# Patient Record
Sex: FEMALE | Race: BLACK OR AFRICAN AMERICAN | Employment: UNEMPLOYED | URBAN - METROPOLITAN AREA
[De-identification: names, ages, dates, MRNs, and addresses within clinical notes are randomized per-mention and may not be internally consistent; named-entity substitution may affect disease eponyms.]

---

## 2017-02-15 ENCOUNTER — TELEPHONE (OUTPATIENT)
Dept: INTERNAL MEDICINE | Age: 47
End: 2017-02-15

## 2017-02-15 DIAGNOSIS — K92.2 UPPER GI BLEED: Primary | ICD-10-CM

## 2017-02-15 DIAGNOSIS — K59.1 FUNCTIONAL DIARRHEA: ICD-10-CM

## 2017-02-15 DIAGNOSIS — K92.0 HEMATEMESIS WITH NAUSEA: ICD-10-CM

## 2017-02-17 PROBLEM — K92.0 GASTROINTESTINAL HEMORRHAGE WITH HEMATEMESIS: Status: ACTIVE | Noted: 2017-02-15

## 2017-02-23 ENCOUNTER — CARE COORDINATION (OUTPATIENT)
Dept: CARE COORDINATION | Age: 47
End: 2017-02-23

## 2017-03-23 RX ORDER — PROMETHAZINE HYDROCHLORIDE 25 MG/1
TABLET ORAL
Qty: 30 TABLET | Refills: 0 | Status: SHIPPED | OUTPATIENT
Start: 2017-03-23 | End: 2017-04-20 | Stop reason: SDUPTHER

## 2017-04-21 ENCOUNTER — OFFICE VISIT (OUTPATIENT)
Dept: INTERNAL MEDICINE | Age: 47
End: 2017-04-21

## 2017-04-21 VITALS
DIASTOLIC BLOOD PRESSURE: 90 MMHG | BODY MASS INDEX: 33.74 KG/M2 | SYSTOLIC BLOOD PRESSURE: 132 MMHG | OXYGEN SATURATION: 97 % | WEIGHT: 215.4 LBS | HEART RATE: 84 BPM

## 2017-04-21 DIAGNOSIS — I10 ESSENTIAL HYPERTENSION: Chronic | ICD-10-CM

## 2017-04-21 DIAGNOSIS — E11.9 TYPE 2 DIABETES MELLITUS WITHOUT COMPLICATION, WITH LONG-TERM CURRENT USE OF INSULIN (HCC): Primary | Chronic | ICD-10-CM

## 2017-04-21 DIAGNOSIS — E78.5 HYPERLIPIDEMIA, UNSPECIFIED HYPERLIPIDEMIA TYPE: Chronic | ICD-10-CM

## 2017-04-21 DIAGNOSIS — Z79.4 TYPE 2 DIABETES MELLITUS WITHOUT COMPLICATION, WITH LONG-TERM CURRENT USE OF INSULIN (HCC): Primary | Chronic | ICD-10-CM

## 2017-04-21 DIAGNOSIS — G47.00 INSOMNIA, UNSPECIFIED TYPE: Chronic | ICD-10-CM

## 2017-04-21 PROCEDURE — 99214 OFFICE O/P EST MOD 30 MIN: CPT | Performed by: INTERNAL MEDICINE

## 2017-04-21 PROCEDURE — 1036F TOBACCO NON-USER: CPT | Performed by: INTERNAL MEDICINE

## 2017-04-21 PROCEDURE — G8417 CALC BMI ABV UP PARAM F/U: HCPCS | Performed by: INTERNAL MEDICINE

## 2017-04-21 PROCEDURE — 3044F HG A1C LEVEL LT 7.0%: CPT | Performed by: INTERNAL MEDICINE

## 2017-04-21 PROCEDURE — G8598 ASA/ANTIPLAT THER USED: HCPCS | Performed by: INTERNAL MEDICINE

## 2017-04-21 PROCEDURE — G8427 DOCREV CUR MEDS BY ELIG CLIN: HCPCS | Performed by: INTERNAL MEDICINE

## 2017-04-21 RX ORDER — CLONAZEPAM 1 MG/1
1 TABLET ORAL 2 TIMES DAILY PRN
COMMUNITY
Start: 2017-04-21 | End: 2017-09-27 | Stop reason: SDUPTHER

## 2017-04-21 RX ORDER — PRAVASTATIN SODIUM 80 MG/1
80 TABLET ORAL EVERY EVENING
COMMUNITY
Start: 2017-04-21 | End: 2018-04-21

## 2017-04-21 RX ORDER — TRAZODONE HYDROCHLORIDE 100 MG/1
200 TABLET ORAL NIGHTLY
COMMUNITY
Start: 2017-04-21

## 2017-04-21 RX ORDER — DULOXETIN HYDROCHLORIDE 30 MG/1
90 CAPSULE, DELAYED RELEASE ORAL DAILY
COMMUNITY
Start: 2017-04-21 | End: 2017-09-27 | Stop reason: ALTCHOICE

## 2017-04-21 ASSESSMENT — PATIENT HEALTH QUESTIONNAIRE - PHQ9
SUM OF ALL RESPONSES TO PHQ9 QUESTIONS 1 & 2: 0
1. LITTLE INTEREST OR PLEASURE IN DOING THINGS: 0
SUM OF ALL RESPONSES TO PHQ QUESTIONS 1-9: 0
SUM OF ALL RESPONSES TO PHQ QUESTIONS 1-9: 0
1. LITTLE INTEREST OR PLEASURE IN DOING THINGS: 0
SUM OF ALL RESPONSES TO PHQ9 QUESTIONS 1 & 2: 0
2. FEELING DOWN, DEPRESSED OR HOPELESS: 0
2. FEELING DOWN, DEPRESSED OR HOPELESS: 0

## 2017-04-29 ASSESSMENT — ENCOUNTER SYMPTOMS
EYES NEGATIVE: 1
ORTHOPNEA: 0
GASTROINTESTINAL NEGATIVE: 1
RESPIRATORY NEGATIVE: 1
SHORTNESS OF BREATH: 0
BLURRED VISION: 0

## 2017-05-08 ENCOUNTER — OFFICE VISIT (OUTPATIENT)
Dept: INTERNAL MEDICINE CLINIC | Age: 47
End: 2017-05-08

## 2017-05-08 VITALS
TEMPERATURE: 98.2 F | SYSTOLIC BLOOD PRESSURE: 130 MMHG | HEART RATE: 101 BPM | DIASTOLIC BLOOD PRESSURE: 72 MMHG | OXYGEN SATURATION: 99 %

## 2017-05-08 DIAGNOSIS — R56.9 SEIZURE (HCC): ICD-10-CM

## 2017-05-08 DIAGNOSIS — Z01.818 PRE-OP EXAMINATION: Primary | ICD-10-CM

## 2017-05-08 DIAGNOSIS — Z86.718 HISTORY OF DVT (DEEP VEIN THROMBOSIS): ICD-10-CM

## 2017-05-08 DIAGNOSIS — M25.562 ACUTE PAIN OF LEFT KNEE: ICD-10-CM

## 2017-05-08 DIAGNOSIS — E66.9 DIABETES MELLITUS TYPE 2 IN OBESE (HCC): ICD-10-CM

## 2017-05-08 DIAGNOSIS — E11.69 DIABETES MELLITUS TYPE 2 IN OBESE (HCC): ICD-10-CM

## 2017-05-08 PROCEDURE — 1036F TOBACCO NON-USER: CPT | Performed by: NURSE PRACTITIONER

## 2017-05-08 PROCEDURE — 99214 OFFICE O/P EST MOD 30 MIN: CPT | Performed by: NURSE PRACTITIONER

## 2017-05-08 PROCEDURE — G8428 CUR MEDS NOT DOCUMENT: HCPCS | Performed by: NURSE PRACTITIONER

## 2017-05-08 PROCEDURE — G8417 CALC BMI ABV UP PARAM F/U: HCPCS | Performed by: NURSE PRACTITIONER

## 2017-05-08 PROCEDURE — G8598 ASA/ANTIPLAT THER USED: HCPCS | Performed by: NURSE PRACTITIONER

## 2017-05-08 RX ORDER — LISINOPRIL 10 MG/1
5 TABLET ORAL DAILY
COMMUNITY
Start: 2017-04-28 | End: 2017-09-27 | Stop reason: ALTCHOICE

## 2017-05-08 ASSESSMENT — ENCOUNTER SYMPTOMS
HEMOPTYSIS: 0
BLOOD IN STOOL: 0
ABDOMINAL PAIN: 0
COUGH: 0
EYE REDNESS: 0
SPUTUM PRODUCTION: 0
PHOTOPHOBIA: 0
SHORTNESS OF BREATH: 0
HEARTBURN: 0
SORE THROAT: 0
NAUSEA: 0
EYE PAIN: 0
VOMITING: 0
DIARRHEA: 0
BACK PAIN: 0
DOUBLE VISION: 0
BLURRED VISION: 0
WHEEZING: 0
ORTHOPNEA: 0
EYE DISCHARGE: 0
CONSTIPATION: 0
STRIDOR: 0

## 2017-08-14 RX ORDER — PROMETHAZINE HYDROCHLORIDE 25 MG/1
TABLET ORAL
Qty: 30 TABLET | Refills: 0 | Status: SHIPPED | OUTPATIENT
Start: 2017-08-14 | End: 2017-09-21 | Stop reason: SDUPTHER

## 2017-09-27 ENCOUNTER — OFFICE VISIT (OUTPATIENT)
Dept: INTERNAL MEDICINE | Age: 47
End: 2017-09-27

## 2017-09-27 VITALS
OXYGEN SATURATION: 98 % | BODY MASS INDEX: 30.45 KG/M2 | HEART RATE: 71 BPM | DIASTOLIC BLOOD PRESSURE: 94 MMHG | HEIGHT: 67 IN | WEIGHT: 194 LBS | SYSTOLIC BLOOD PRESSURE: 152 MMHG

## 2017-09-27 DIAGNOSIS — K92.0 GASTROINTESTINAL HEMORRHAGE WITH HEMATEMESIS: ICD-10-CM

## 2017-09-27 DIAGNOSIS — E78.5 HYPERLIPIDEMIA, UNSPECIFIED HYPERLIPIDEMIA TYPE: Chronic | ICD-10-CM

## 2017-09-27 DIAGNOSIS — D64.9 ANEMIA, UNSPECIFIED TYPE: ICD-10-CM

## 2017-09-27 DIAGNOSIS — I10 ESSENTIAL HYPERTENSION: Chronic | ICD-10-CM

## 2017-09-27 DIAGNOSIS — K92.1 MELENA: ICD-10-CM

## 2017-09-27 DIAGNOSIS — E11.9 TYPE 2 DIABETES MELLITUS WITHOUT COMPLICATION, WITH LONG-TERM CURRENT USE OF INSULIN (HCC): Primary | Chronic | ICD-10-CM

## 2017-09-27 DIAGNOSIS — K92.1 HEMATOCHEZIA: ICD-10-CM

## 2017-09-27 DIAGNOSIS — R56.9 SEIZURES (HCC): ICD-10-CM

## 2017-09-27 DIAGNOSIS — F31.81 BIPOLAR II DISORDER (HCC): ICD-10-CM

## 2017-09-27 DIAGNOSIS — Z79.4 TYPE 2 DIABETES MELLITUS WITHOUT COMPLICATION, WITH LONG-TERM CURRENT USE OF INSULIN (HCC): Chronic | ICD-10-CM

## 2017-09-27 DIAGNOSIS — F44.9 CONVERSION DISORDER: Chronic | ICD-10-CM

## 2017-09-27 DIAGNOSIS — K92.0 HEMATEMESIS WITH NAUSEA: ICD-10-CM

## 2017-09-27 DIAGNOSIS — F41.9 ANXIETY: Chronic | ICD-10-CM

## 2017-09-27 DIAGNOSIS — E11.9 TYPE 2 DIABETES MELLITUS WITHOUT COMPLICATION, WITH LONG-TERM CURRENT USE OF INSULIN (HCC): Chronic | ICD-10-CM

## 2017-09-27 DIAGNOSIS — R11.0 NAUSEA: ICD-10-CM

## 2017-09-27 DIAGNOSIS — Z79.4 TYPE 2 DIABETES MELLITUS WITHOUT COMPLICATION, WITH LONG-TERM CURRENT USE OF INSULIN (HCC): Primary | Chronic | ICD-10-CM

## 2017-09-27 LAB
ALBUMIN SERPL-MCNC: 4.4 G/DL (ref 3.4–5)
ALP BLD-CCNC: 67 U/L (ref 40–129)
ALT SERPL-CCNC: 22 U/L (ref 10–40)
ANION GAP SERPL CALCULATED.3IONS-SCNC: 13 MMOL/L (ref 3–16)
AST SERPL-CCNC: 33 U/L (ref 15–37)
BASOPHILS ABSOLUTE: 0 K/UL (ref 0–0.2)
BASOPHILS RELATIVE PERCENT: 0.5 %
BILIRUB SERPL-MCNC: <0.2 MG/DL (ref 0–1)
BILIRUBIN DIRECT: <0.2 MG/DL (ref 0–0.3)
BILIRUBIN URINE: ABNORMAL
BILIRUBIN, INDIRECT: NORMAL MG/DL (ref 0–1)
BLOOD, URINE: NEGATIVE
BUN BLDV-MCNC: 14 MG/DL (ref 7–20)
CALCIUM SERPL-MCNC: 9.3 MG/DL (ref 8.3–10.6)
CHLORIDE BLD-SCNC: 103 MMOL/L (ref 99–110)
CHOLESTEROL, TOTAL: 172 MG/DL (ref 0–199)
CLARITY: ABNORMAL
CO2: 29 MMOL/L (ref 21–32)
COLOR: ABNORMAL
CREAT SERPL-MCNC: 1.2 MG/DL (ref 0.6–1.1)
CREATININE URINE: 418.5 MG/DL (ref 28–259)
EOSINOPHILS ABSOLUTE: 0.1 K/UL (ref 0–0.6)
EOSINOPHILS RELATIVE PERCENT: 2 %
EPITHELIAL CELLS, UA: 3 /HPF (ref 0–5)
FERRITIN: 47.6 NG/ML (ref 15–150)
FOLATE: 12.83 NG/ML (ref 4.78–24.2)
GFR AFRICAN AMERICAN: 58
GFR NON-AFRICAN AMERICAN: 48
GLUCOSE BLD-MCNC: 77 MG/DL (ref 70–99)
GLUCOSE URINE: NEGATIVE MG/DL
HAPTOGLOBIN: 102 MG/DL (ref 30–200)
HCT VFR BLD CALC: 35.1 % (ref 36–48)
HDLC SERPL-MCNC: 83 MG/DL (ref 40–60)
HEMOGLOBIN: 11.5 G/DL (ref 12–16)
HYALINE CASTS: 3 /LPF (ref 0–8)
IRON SATURATION: 30 % (ref 15–50)
IRON: 85 UG/DL (ref 37–145)
KETONES, URINE: ABNORMAL MG/DL
LACTATE DEHYDROGENASE: 240 U/L (ref 100–190)
LDL CHOLESTEROL CALCULATED: 74 MG/DL
LEUKOCYTE ESTERASE, URINE: NEGATIVE
LYMPHOCYTES ABSOLUTE: 2.2 K/UL (ref 1–5.1)
LYMPHOCYTES RELATIVE PERCENT: 53.1 %
MCH RBC QN AUTO: 28.3 PG (ref 26–34)
MCHC RBC AUTO-ENTMCNC: 32.7 G/DL (ref 31–36)
MCV RBC AUTO: 86.4 FL (ref 80–100)
MICROALBUMIN UR-MCNC: <1.2 MG/DL
MICROALBUMIN/CREAT UR-RTO: ABNORMAL MG/G (ref 0–30)
MICROSCOPIC EXAMINATION: YES
MONOCYTES ABSOLUTE: 0.2 K/UL (ref 0–1.3)
MONOCYTES RELATIVE PERCENT: 4.5 %
NEUTROPHILS ABSOLUTE: 1.7 K/UL (ref 1.7–7.7)
NEUTROPHILS RELATIVE PERCENT: 39.9 %
NITRITE, URINE: NEGATIVE
PDW BLD-RTO: 13.2 % (ref 12.4–15.4)
PH UA: 6
PHOSPHORUS: 4 MG/DL (ref 2.5–4.9)
PLATELET # BLD: 206 K/UL (ref 135–450)
PMV BLD AUTO: 8.9 FL (ref 5–10.5)
POTASSIUM SERPL-SCNC: 3.8 MMOL/L (ref 3.5–5.1)
PROTEIN UA: 30 MG/DL
RBC # BLD: 4.06 M/UL (ref 4–5.2)
RBC UA: 3 /HPF (ref 0–4)
SODIUM BLD-SCNC: 145 MMOL/L (ref 136–145)
SPECIFIC GRAVITY UA: 1.03
TOTAL IRON BINDING CAPACITY: 282 UG/DL (ref 260–445)
TOTAL PROTEIN: 7.4 G/DL (ref 6.4–8.2)
TRIGL SERPL-MCNC: 77 MG/DL (ref 0–150)
TSH SERPL DL<=0.05 MIU/L-ACNC: 1.98 UIU/ML (ref 0.27–4.2)
URINE TYPE: ABNORMAL
UROBILINOGEN, URINE: 0.2 E.U./DL
VITAMIN B-12: 594 PG/ML (ref 211–911)
VLDLC SERPL CALC-MCNC: 15 MG/DL
WBC # BLD: 4.2 K/UL (ref 4–11)
WBC UA: 3 /HPF (ref 0–5)

## 2017-09-27 PROCEDURE — 1036F TOBACCO NON-USER: CPT | Performed by: INTERNAL MEDICINE

## 2017-09-27 PROCEDURE — 3044F HG A1C LEVEL LT 7.0%: CPT | Performed by: INTERNAL MEDICINE

## 2017-09-27 PROCEDURE — 99213 OFFICE O/P EST LOW 20 MIN: CPT | Performed by: INTERNAL MEDICINE

## 2017-09-27 PROCEDURE — G8598 ASA/ANTIPLAT THER USED: HCPCS | Performed by: INTERNAL MEDICINE

## 2017-09-27 PROCEDURE — G8427 DOCREV CUR MEDS BY ELIG CLIN: HCPCS | Performed by: INTERNAL MEDICINE

## 2017-09-27 PROCEDURE — G8417 CALC BMI ABV UP PARAM F/U: HCPCS | Performed by: INTERNAL MEDICINE

## 2017-09-27 RX ORDER — CLONAZEPAM 1 MG/1
1 TABLET ORAL DAILY PRN
COMMUNITY
Start: 2017-09-27

## 2017-09-27 RX ORDER — QUETIAPINE FUMARATE 100 MG/1
100 TABLET, FILM COATED ORAL NIGHTLY
COMMUNITY
Start: 2017-09-27 | End: 2018-06-29 | Stop reason: ALTCHOICE

## 2017-09-27 RX ORDER — DULOXETIN HYDROCHLORIDE 60 MG/1
60 CAPSULE, DELAYED RELEASE ORAL 2 TIMES DAILY
COMMUNITY
Start: 2017-09-27 | End: 2018-06-29 | Stop reason: ALTCHOICE

## 2017-09-27 RX ORDER — LIDOCAINE AND PRILOCAINE 25; 25 MG/G; MG/G
CREAM TOPICAL
COMMUNITY
Start: 2017-09-27

## 2017-09-27 ASSESSMENT — ENCOUNTER SYMPTOMS
COUGH: 1
ORTHOPNEA: 0
SHORTNESS OF BREATH: 0
HEMATOCHEZIA: 1
BLURRED VISION: 0

## 2017-09-27 NOTE — MR AVS SNAPSHOT
After Visit Summary             Cristian Nieves   2017 10:45 AM   Office Visit    Description:  Female : 1970   Provider:  Surjit Lorenzana MD   Department:  [facility] [address]              Your Follow-Up and Future Appointments         Below is a list of your follow-up and future appointments. This may not be a complete list as you may have made appointments directly with providers that we are not aware of or your providers may have made some for you. Please call your providers to confirm appointments. It is important to keep your appointments. Please bring your current insurance card, photo ID, co-pay, and all medication bottles to your appointment. If self-pay, payment is expected at the time of service. Your To-Do List     Future Appointments Provider Department Dept Phone    10/23/2017 10:30 AM Surjit Lorenzana MD [facility] [address] [phone]    Please arrive 15 minutes prior to appointment, bring photo ID and insurance card.     Future Orders Complete By Expires    CBC Auto Differential [DUI8852 Custom]  2017    Ferritin [LAB68 Custom]  2017    Folate [LAB69 Custom]  2017    Haptoglobin [LAB89 Custom]  2017    Hemoglobin A1C [LAB90 Custom]  2017    Hepatic Function Panel [LAB20 Custom]  2017    Iron and TIBC [MGG695 Custom]  2017    Lactate Dehydrogenase [LAB96 Custom]  2017    Lipid Panel [LAB18 Custom]  2017    Methylmalonic Acid, Serum [DDT167 Custom]  2017    Microalbumin / Creatinine Urine Ratio [KVF387 Custom]  2017    Renal Function Panel [LAB19 Custom]  2017    Soluble transferrin receptor [AXE0158 Custom]  2017    TSH without Reflex [VLC720 Custom]  2017    Urinalysis [CFM637 Custom]  2017    Vitamin B12 [LAB67 Custom]  2017 Information from Your Visit        Department     Name Address Phone Fax    [address] [place]  [address], [address] [phone]      You Were Seen for:         Comments    Type 2 diabetes mellitus without complication, with long-term current use of insulin ([facility])   [[id number]]         Vital Signs     Blood Pressure Pulse Height Weight Oxygen Saturation Body Mass Index    152/94 (Site: Right Arm, Position: Sitting, Cuff Size: Medium Adult) 71 5' 7\" (1.702 m) 194 lb (88 kg) 98% 30.38 kg/m2    Smoking Status                   Never Smoker           Additional Information about your Body Mass Index (BMI)           Your BMI as listed above is considered obese (30 or more). BMI is an estimate of body fat, calculated from your height and weight. The higher your BMI, the greater your risk of heart disease, high blood pressure, type 2 diabetes, stroke, gallstones, arthritis, sleep apnea, and certain cancers. BMI is not perfect. It may overestimate body fat in athletes and people who are more muscular. Even a small weight loss (between 5 and 10 percent of your current weight) by decreasing your calorie intake and becoming more physically active will help lower your risk of developing or worsening diseases associated with obesity. Learn more at: [employer]             Today's Medication Changes          These changes are accurate as of: 9/27/17 11:58 AM.  If you have any questions, ask your nurse or doctor. START taking these medications           DULoxetine 60 MG extended release capsule   Commonly known as:  CYMBALTA   Instructions:   Take 1 capsule by mouth 2 times daily   Refills:  0   Started by:  Stacy Jarrett MD       lidocaine-prilocaine 2.5-2.5 % cream   Commonly known as:  EMLA   Instructions:  apply to the lower back twice a day as needed while using a TENS unit   Refills:  0 hydrochlorothiazide (HYDRODIURIL) 25 MG tablet Take 1 tablet by mouth daily    isosorbide mononitrate (IMDUR) 30 MG CR tablet Take 1 tablet by mouth daily    candesartan (ATACAND) 32 MG tablet Take 1 tablet by mouth daily    Dulaglutide 1.5 MG/0.5ML SOPN Inject 1.5 mg into the skin every 7 days (On Saturdays)    gabapentin (NEURONTIN) 800 MG tablet Take 1 tablet by mouth 3 times daily    HYDROcodone-acetaminophen (NORCO) 5-325 MG per tablet Take 1 tablet by mouth 3 times daily as needed for Pain    docusate sodium (COLACE) 100 MG capsule Take 1 capsule by mouth 2 times daily    Melatonin 3 MG CAPS Take 1 capsule by mouth nightly    vitamin D (CHOLECALCIFEROL) 1000 UNIT TABS tablet Take 1 tablet by mouth daily    promethazine (PHENERGAN) 25 MG tablet TAKE ONE TABLET BY MOUTH TWICE A DAY AS NEEDED FOR NAUSEA      Allergies              Naprosyn [Naproxen] Hives, Shortness Of Breath    Compazine [Prochlorperazine Maleate] Hives    Ketorolac Tromethamine Other (See Comments)    Breathing problems    Tramadol Rash      We Ordered/Performed the following           Ambulatory referral to Gastroenterology     Scheduling Instructions:     [address] [facility] --- [phone]  Fax [phone] [facility] - medical assistant for Dr. Karey Forde)      Dr. Anusha Duncan - office locations [place]  Dr. Mila Lopez - office locations [place]  Dr. Finn Calderon - office locations [place] - office locations [place]    Dr. Scooby Ham - office location [place]  Dr. Charley Rossi - office location [place]  Dr. Imelda Winchester - office locations [place]/Kevin Fleming - office locations [place]/Kevin Quiñonez - office locations [place]    Dr. Isidro Porter [facility] Dr. Evert Nieves [facility]    Dr. Jayde Mitchell Folds    Comments: The patient can be scheduled with any member of the group, including the provider with the first available appointments. Additional Information        Basic Information     Date Of Birth Sex Race Ethnicity Preferred Language    1970 Female Black Non-/Non  English      Problem List as of 9/27/2017  Date Reviewed: 10/19/2016                Type 2 diabetes mellitus without complication, with long-term current use of insulin (HCC) (Chronic)    Essential hypertension (Chronic)    Hyperlipidemia (Chronic)    Bone spur (Chronic)    Seizure disorder (HCC) (Chronic)    Insomnia (Chronic)    Abdominal adhesions (Chronic)    Conversion disorder (Chronic)    Fatty liver (Chronic)    Anxiety (Chronic)    Lumbar radiculopathy (Chronic)    Gastroesophageal reflux disease without esophagitis (Chronic)    Hematochezia    Anemia    Melena    Bipolar 2 disorder (HCC)    PTSD (post-traumatic stress disorder)    Suicidal ideation    Epigastric abdominal pain    Seizure (HCC)    Hematemesis with nausea    Gastrointestinal hemorrhage with hematemesis    Functional diarrhea    Obesity (BMI 30-39. 9)    Preoperative examination    Unstable angina (HCC)    Generalized abdominal pain    Nausea    Intractable vomiting with nausea    Left wrist pain    Chest pain    Tachycardia    Leg swelling    Seizure grand mal (HCC)    Hypoglycemia    History of DVT (deep vein thrombosis)      Your Goals as of 9/27/2017 at 11:58 AM              Today    Today    9/7/17       Blood Pressure    Blood Pressure < 140/90   152/94  162/100  132/89    Blood Pressure < 140/90   152/94  162/100  132/89    Notes    Barriers to success: none  Plan for overcoming my barriers: N/A     Confidence: 1/10 Date goal set: 4/21/17  Date goal attained:           Immunizations as of 9/27/2017     Name Date    Influenza Vaccine, unspecified formulation 10/1/2016    Influenza Virus Vaccine 10/16/2012, 1/8/2011    Pneumococcal Conjugate 7-valent 10/8/2009      Preventive Care        Date Due    Tetanus Combination Vaccine (1 - Tdap) 3/2/1989    Diabetic Foot Exam 9/1/2016    Eye Exam By An Eye Doctor 9/1/2016    Urine Check For Kidney Problems 2/10/2017    Cholesterol Screening 5/6/2017    Yearly Flu Vaccine (1) 9/1/2017    Hemoglobin A1C (Test For Long-Term Glucose Control) 10/28/2017    Pap Smear 2/10/2019            MyChart Signup           Our records indicate that you have an active [employer] account. You can view your After Visit Summary by going to [email or web address] and logging in with your Indexing username and password. If you don't have a Indexing username and password but a parent or guardian has access to your record, the parent or guardian should login with their own [employer] username and password and access your record to view the After Visit Summary. Additional Information  If you have questions, please contact the physician practice where you receive care. Remember, Indexing is NOT to be used for urgent needs. For medical emergencies, dial 911. For questions regarding your [employer] account call [phone]. If you have a clinical question, please call your doctor's office.

## 2017-09-27 NOTE — PROGRESS NOTES
HYDROcodone-acetaminophen (NORCO) 5-325 MG per tablet Take 1 tablet by mouth 3 times daily as needed for Pain      docusate sodium (COLACE) 100 MG capsule Take 1 capsule by mouth 2 times daily      Melatonin 3 MG CAPS Take 1 capsule by mouth nightly      vitamin D (CHOLECALCIFEROL) 1000 UNIT TABS tablet Take 1 tablet by mouth daily                     Allergies   Allergen Reactions    Naprosyn [Naproxen] Hives and Shortness Of Breath    Compazine [Prochlorperazine Maleate] Hives    Ketorolac Tromethamine Other (See Comments)     Breathing problems    Tramadol Rash                  Immunization History   Administered Date(s) Administered    Influenza Vaccine, unspecified formulation 10/01/2016    Influenza Virus Vaccine 01/08/2011, 10/16/2012    Pneumococcal Conjugate 7-valent 10/08/2009                  Patient Active Problem List   Diagnosis Code    Bone spur M77.9    Hypoglycemia E16.2    History of DVT (deep vein thrombosis) Z86.718    Seizure grand mal ([address] G40.409    Chest pain R07.9    Tachycardia R00.0    Leg swelling M79.89    Essential hypertension I10    Hyperlipidemia E78.5    Gastroesophageal reflux disease without esophagitis K21.9    Seizure disorder ([facility]) G40.909    Lumbar radiculopathy M54.16    Left wrist pain M25.532    Anxiety F41.9    Generalized abdominal pain R10.84    Nausea R11.0    Intractable vomiting with nausea R11.2    Fatty liver K76.0    Unstable angina ([facility]) I20.0    Abdominal pain, left lower quadrant R10.32    Hypertensive urgency I16.0    Pseudoseizures F44.5    Diabetes mellitus type 2 in obese (HCC) E11.9, E66.9    Pleurodynia R07.81    Abdominal adhesions K66.0    Conversion disorder F44.9    Insomnia G47.00    Type 2 diabetes mellitus without complication, with long-term current use of insulin ([facility]) E11.9, Z79.4    Preoperative examination Z01.818    Obesity (BMI 30-39. 9) E66.9    Hematemesis with nausea K92.0, R11.0    HISTORY N/A 10/27/2016    DIAGNOSTIC LAPAROSCOPY WITH LYSIS OF ADHESIONS    SALPINGO-OOPHORECTOMY  2001    [facility] - ZACHERY - left   Melva Grave SHOULDER SURGERY      x 2    TOE SURGERY  March 10, 2011    excision of bone spur left hallux    TOE SURGERY  March 10, 2011    Sven Man arthrodesis left SUB JOINT    UPPER GASTROINTESTINAL ENDOSCOPY                      Social History     Social History    Marital status:      Spouse name: Javid Marquez Number of children: 2    Years of education: N/A     Occupational History    disability      bipolar     Social History Main Topics    Smoking status: Never Smoker    Smokeless tobacco: Never Used    Alcohol use No    Drug use: No    Sexual activity: Yes     Partners: Male      Comment:  - [facility] [place] [id number], [address]     Other Topics Concern    None     Social History Narrative                    Family History   Problem Relation Age of Onset    Diabetes Mother     Heart Disease Mother     Stroke Mother     High Blood Pressure Mother     Emphysema Father     Liver Disease Father     High Blood Pressure Father     Heart Disease Father     Stroke Father     Colon Cancer Father     Cancer Maternal Aunt      uterine, breast    High Blood Pressure Sister                     Controlled Substances Monitoring:                   Review of Systems:           Review of Systems   Eyes: Negative for blurred vision. Respiratory: Positive for cough. Negative for shortness of breath. Cardiovascular: Positive for chest pain. Negative for palpitations, orthopnea and PND. Gastrointestinal: Positive for hematochezia. Musculoskeletal: Negative for neck pain. Neurological: Negative for focal weakness. Physical Examination:             Physical Exam   Constitutional: She appears well-developed and well-nourished. No distress. HENT:   Head: Normocephalic and atraumatic. Eyes: Conjunctivae and EOM are normal. Right eye exhibits no discharge.  Left eye exhibits no discharge. No scleral icterus. Neck: No tracheal deviation present. Pulmonary/Chest: Effort normal. No respiratory distress. Neurological: She is alert. No cranial nerve deficit. Skin: Skin is warm and dry. She is not diaphoretic. Vitals reviewed. Assessment and Plan:            Encounter Diagnoses   Name Primary?  Type 2 diabetes mellitus without complication, with long-term current use of insulin (HCC) Yes    Conversion disorder     Essential hypertension     Hyperlipidemia, unspecified hyperlipidemia type     Anxiety     Gastrointestinal hemorrhage with hematemesis     Hematemesis with nausea     Nausea     Hematochezia     Melena     Anemia, unspecified type               No problem-specific Assessment & Plan notes found for this encounter. The conditions, diseases and symptoms above (see encounter diagnosis list) were reviewed and assessed. The patient's chronic medical problems that were addressed during this appointment are stable or controlled unless noted otherwise. The chart was updated as necessary. Labs reviewed as necessary. New labs ordered as clinically indicated. Imaging studies reviewed as necessary. Medications reviewed. New medications ordered as clinically indicated. New orders written as clinically indicated (see below). Referrals made to specialists as necessary. Patient education provided verbally and in writing as necessary. Orders Placed This Encounter   Procedures    CBC Auto Differential     Standing Status:   Future     Standing Expiration Date:   9/27/2018    Iron and TIBC     Standing Status:   Future     Standing Expiration Date:   9/27/2018     Order Specific Question:   Is Patient Fasting? Answer:   yes      Order Specific Question:   No of Hours?      Answer:   10    Ferritin     Standing Status:   Future     Standing Expiration Date:   9/27/2018    Vitamin B12     Standing type  -     Lipid Panel; Future  -     Hepatic Function Panel; Future  -     TSH without Reflex; Future    Anxiety    Gastrointestinal hemorrhage with hematemesis  -     CBC Auto Differential; Future  -     Iron and TIBC; Future  -     Ferritin; Future  -     Ambulatory referral to Gastroenterology    Hematemesis with nausea  -     CBC Auto Differential; Future  -     Iron and TIBC; Future  -     Ferritin; Future  -     Ambulatory referral to Gastroenterology    Nausea  -     Ambulatory referral to Gastroenterology    Hematochezia  -     CBC Auto Differential; Future  -     Iron and TIBC; Future  -     Ferritin; Future  -     Ambulatory referral to Gastroenterology    Melena  -     CBC Auto Differential; Future  -     Iron and TIBC; Future  -     Ferritin; Future  -     Ambulatory referral to Gastroenterology    Anemia, unspecified type  -     CBC Auto Differential; Future  -     Iron and TIBC; Future  -     Ferritin; Future  -     Vitamin B12; Future  -     Folate; Future  -     Lactate Dehydrogenase; Future  -     Haptoglobin; Future  -     Soluble transferrin receptor; Future  -     Methylmalonic Acid, Serum; Future  -     Renal Function Panel; Future    Other orders  -     DULoxetine (CYMBALTA) 60 MG extended release capsule; Take 1 capsule by mouth 2 times daily  -     QUEtiapine (SEROQUEL) 100 MG tablet; Take 1 tablet by mouth nightly  -     lidocaine-prilocaine (EMLA) 2.5-2.5 % cream; apply to the lower back twice a day as needed while using a TENS unit  -     clonazePAM (KLONOPIN) 1 MG tablet; Take 1 tablet by mouth daily as needed for Anxiety               PROBAPNOTES    No problem-specific Assessment & Plan notes found for this encounter.              PROBDIAG    Problem List Items Addressed This Visit        Chronic    Type 2 diabetes mellitus without complication, with long-term current use of insulin ([address] - Primary (Chronic)    Relevant Orders    Urinalysis    Microalbumin / Creatinine Urine Ratio

## 2017-09-28 LAB
ESTIMATED AVERAGE GLUCOSE: 125.5 MG/DL
HBA1C MFR BLD: 6 %

## 2017-09-29 LAB — SOLUBLE TRANSFERRIN RECEPT: 3.5 MG/L (ref 1.9–4.4)

## 2017-09-30 LAB — METHYLMALONIC ACID: 0.15 UMOL/L (ref 0–0.4)

## 2017-10-23 ENCOUNTER — TELEPHONE (OUTPATIENT)
Dept: INTERNAL MEDICINE | Age: 47
End: 2017-10-23

## 2017-10-30 ENCOUNTER — TELEPHONE (OUTPATIENT)
Dept: INTERNAL MEDICINE | Age: 47
End: 2017-10-30

## 2017-11-01 RX ORDER — ONDANSETRON 4 MG/1
4 TABLET, ORALLY DISINTEGRATING ORAL EVERY 8 HOURS PRN
Qty: 10 TABLET | Refills: 0 | Status: SHIPPED | OUTPATIENT
Start: 2017-11-01 | End: 2018-06-29 | Stop reason: ALTCHOICE

## 2017-11-01 NOTE — TELEPHONE ENCOUNTER
I spoke with the patient about this. She stated that Zofran does not help her.  That's why she was put on phenergan last rx'd 9/2017

## 2017-11-02 RX ORDER — PROMETHAZINE HYDROCHLORIDE 12.5 MG/1
12.5 TABLET ORAL EVERY 4 HOURS PRN
Qty: 20 TABLET | Refills: 1 | Status: SHIPPED | OUTPATIENT
Start: 2017-11-02 | End: 2017-11-09

## 2017-11-14 ENCOUNTER — OFFICE VISIT (OUTPATIENT)
Dept: PRIMARY CARE CLINIC | Age: 47
End: 2017-11-14

## 2017-11-14 DIAGNOSIS — N80.9 ENDOMETRIOSIS: Primary | ICD-10-CM

## 2017-11-14 DIAGNOSIS — E78.5 HYPERLIPIDEMIA, UNSPECIFIED HYPERLIPIDEMIA TYPE: Chronic | ICD-10-CM

## 2017-11-14 DIAGNOSIS — E11.9 TYPE 2 DIABETES MELLITUS WITHOUT COMPLICATION, WITH LONG-TERM CURRENT USE OF INSULIN (HCC): Chronic | ICD-10-CM

## 2017-11-14 DIAGNOSIS — Z79.4 TYPE 2 DIABETES MELLITUS WITHOUT COMPLICATION, WITH LONG-TERM CURRENT USE OF INSULIN (HCC): Chronic | ICD-10-CM

## 2017-11-14 DIAGNOSIS — F44.5 PSEUDOSEIZURE: ICD-10-CM

## 2017-11-14 DIAGNOSIS — I10 ESSENTIAL HYPERTENSION: Chronic | ICD-10-CM

## 2017-11-14 DIAGNOSIS — F31.81 BIPOLAR 2 DISORDER (HCC): ICD-10-CM

## 2017-11-14 PROCEDURE — G0008 ADMIN INFLUENZA VIRUS VAC: HCPCS | Performed by: INTERNAL MEDICINE

## 2017-11-14 PROCEDURE — 90686 IIV4 VACC NO PRSV 0.5 ML IM: CPT | Performed by: INTERNAL MEDICINE

## 2017-11-14 PROCEDURE — G8417 CALC BMI ABV UP PARAM F/U: HCPCS | Performed by: INTERNAL MEDICINE

## 2017-11-14 PROCEDURE — 3044F HG A1C LEVEL LT 7.0%: CPT | Performed by: INTERNAL MEDICINE

## 2017-11-14 PROCEDURE — 99215 OFFICE O/P EST HI 40 MIN: CPT | Performed by: INTERNAL MEDICINE

## 2017-11-14 PROCEDURE — G8484 FLU IMMUNIZE NO ADMIN: HCPCS | Performed by: INTERNAL MEDICINE

## 2017-11-14 PROCEDURE — G8598 ASA/ANTIPLAT THER USED: HCPCS | Performed by: INTERNAL MEDICINE

## 2017-11-14 PROCEDURE — G8427 DOCREV CUR MEDS BY ELIG CLIN: HCPCS | Performed by: INTERNAL MEDICINE

## 2017-11-14 PROCEDURE — 1036F TOBACCO NON-USER: CPT | Performed by: INTERNAL MEDICINE

## 2017-11-14 RX ORDER — BUSPIRONE HYDROCHLORIDE 5 MG/1
5 TABLET ORAL DAILY
COMMUNITY

## 2017-12-20 ENCOUNTER — OFFICE VISIT (OUTPATIENT)
Dept: PRIMARY CARE CLINIC | Age: 47
End: 2017-12-20

## 2017-12-20 VITALS
HEART RATE: 72 BPM | DIASTOLIC BLOOD PRESSURE: 80 MMHG | WEIGHT: 185.4 LBS | SYSTOLIC BLOOD PRESSURE: 150 MMHG | BODY MASS INDEX: 29.04 KG/M2

## 2017-12-20 DIAGNOSIS — R42 LIGHT HEADED: Primary | ICD-10-CM

## 2017-12-20 LAB — GLUCOSE BLD-MCNC: 139 MG/DL

## 2017-12-20 PROCEDURE — G8427 DOCREV CUR MEDS BY ELIG CLIN: HCPCS | Performed by: INTERNAL MEDICINE

## 2017-12-20 PROCEDURE — G8598 ASA/ANTIPLAT THER USED: HCPCS | Performed by: INTERNAL MEDICINE

## 2017-12-20 PROCEDURE — G8484 FLU IMMUNIZE NO ADMIN: HCPCS | Performed by: INTERNAL MEDICINE

## 2017-12-20 PROCEDURE — 1036F TOBACCO NON-USER: CPT | Performed by: INTERNAL MEDICINE

## 2017-12-20 PROCEDURE — 99214 OFFICE O/P EST MOD 30 MIN: CPT | Performed by: INTERNAL MEDICINE

## 2017-12-20 PROCEDURE — G8417 CALC BMI ABV UP PARAM F/U: HCPCS | Performed by: INTERNAL MEDICINE

## 2017-12-20 PROCEDURE — 82962 GLUCOSE BLOOD TEST: CPT | Performed by: INTERNAL MEDICINE

## 2017-12-27 ENCOUNTER — OFFICE VISIT (OUTPATIENT)
Dept: PRIMARY CARE CLINIC | Age: 47
End: 2017-12-27

## 2017-12-27 VITALS
SYSTOLIC BLOOD PRESSURE: 122 MMHG | DIASTOLIC BLOOD PRESSURE: 78 MMHG | HEART RATE: 88 BPM | BODY MASS INDEX: 29.41 KG/M2 | WEIGHT: 187.8 LBS

## 2017-12-27 DIAGNOSIS — R42 LIGHT HEADEDNESS: Primary | ICD-10-CM

## 2017-12-27 PROCEDURE — G8598 ASA/ANTIPLAT THER USED: HCPCS | Performed by: INTERNAL MEDICINE

## 2017-12-27 PROCEDURE — G8427 DOCREV CUR MEDS BY ELIG CLIN: HCPCS | Performed by: INTERNAL MEDICINE

## 2017-12-27 PROCEDURE — 1036F TOBACCO NON-USER: CPT | Performed by: INTERNAL MEDICINE

## 2017-12-27 PROCEDURE — G8484 FLU IMMUNIZE NO ADMIN: HCPCS | Performed by: INTERNAL MEDICINE

## 2017-12-27 PROCEDURE — G8417 CALC BMI ABV UP PARAM F/U: HCPCS | Performed by: INTERNAL MEDICINE

## 2017-12-27 PROCEDURE — 99214 OFFICE O/P EST MOD 30 MIN: CPT | Performed by: INTERNAL MEDICINE

## 2017-12-27 RX ORDER — MECLIZINE HYDROCHLORIDE 25 MG/1
25 TABLET ORAL 3 TIMES DAILY PRN
Qty: 21 TABLET | Refills: 0 | Status: SHIPPED | OUTPATIENT
Start: 2017-12-27 | End: 2018-01-03

## 2017-12-27 NOTE — PATIENT INSTRUCTIONS
another inner ear problem, you may have vertigo off and on. Do these exercises first thing in the morning and before you go to bed. You might get dizzy when you first start them. If this happens, try to do them for at least 5 minutes. Do a group of exercises at a time, starting at the top of the list. It may take several weeks before you can do all the exercises without feeling dizzy. Follow-up care is a key part of your treatment and safety. Be sure to make and go to all appointments, and call your doctor if you are having problems. It's also a good idea to know your test results and keep a list of the medicines you take. How can you care for yourself at home? Exercise 1  While sitting on the side of the bed and holding your head still:  · Look up as far as you can. · Look down as far as you can. · Look from side to side as far as you can. · Stretch your arm straight out in front of you. Focus on your index finger. Continue to focus on your finger while you bring it to your nose. Exercise 2  While sitting on the side of the bed:  · Bring your head as far back as you can. · Bring your head forward to touch your chin to your chest.  · Turn your head from side to side. · Do these exercises first with your eyes open. Then try with your eyes closed. Exercise 3  While sitting on the side of the bed:  · Shrug your shoulders straight upward, then relax them. · Bend over and try to touch the ground with your fingers. Then go back to a sitting position. · Toss a small ball from one hand to the other. Throw the ball higher than your eyes so you have to look up. Exercise 4  While standing (with someone close by if you feel uncomfortable):  · Repeat Exercise 1.  · Repeat Exercise 2.  · Pass a ball between your legs and above your head. · Sit down and then stand up. Repeat. Turn around in a Jena a different way each time you stand.   · With someone close by to help you, try the above exercises with your eyes

## 2017-12-29 NOTE — PROGRESS NOTES
Subjective:      Patient ID: Eleonora Lubin is a 52 y.o. female. HPI  Patient with occasional light headedness without vertigo. Thought it was due to keppra started in hospital when admitted for N/V but occurring still. No hearing problems or tinnitus. Review of Systems  Per HPI    Objective:   Physical Exam   Constitutional: She is oriented to person, place, and time. She appears well-developed and well-nourished. No distress. HENT:   Head: Normocephalic and atraumatic. Mouth/Throat: No oropharyngeal exudate. Eyes: Conjunctivae are normal. Right eye exhibits no discharge. Left eye exhibits no discharge. No scleral icterus. Cardiovascular: Normal rate, regular rhythm, normal heart sounds and intact distal pulses. Exam reveals no gallop and no friction rub. No murmur heard. Pulmonary/Chest: Effort normal and breath sounds normal. No respiratory distress. She has no wheezes. She has no rales. Abdominal: Soft. Bowel sounds are normal. There is no tenderness. There is no guarding. Neurological: She is alert and oriented to person, place, and time. She has normal reflexes. No cranial nerve deficit. She exhibits normal muscle tone. Coordination normal.   Skin: Skin is warm and dry. She is not diaphoretic. Psychiatric: She has a normal mood and affect. Judgment normal.   Vitals reviewed. Assessment:     A total of 25 minutes spent with the patient today greater than 50% in counseling regarding these issues. Light headedness. Exam normal. No vertigo or other symptoms.    -Topic and history reviewed. Handout given. Reviewed hydration.  -Will try exercises and antivert as ordered.  -Follow up 2 weeks if not improved.       Plan:

## 2018-01-29 VITALS
SYSTOLIC BLOOD PRESSURE: 138 MMHG | HEIGHT: 67 IN | HEART RATE: 84 BPM | WEIGHT: 191.4 LBS | BODY MASS INDEX: 30.04 KG/M2 | TEMPERATURE: 98.3 F | DIASTOLIC BLOOD PRESSURE: 84 MMHG

## 2018-01-29 ASSESSMENT — ENCOUNTER SYMPTOMS
NAUSEA: 0
BACK PAIN: 0
WHEEZING: 0
CHEST TIGHTNESS: 0
EYE REDNESS: 0
EYE ITCHING: 0
DIARRHEA: 0
COUGH: 0
ABDOMINAL PAIN: 0
EYE PAIN: 0
CONSTIPATION: 0
VOMITING: 0
SORE THROAT: 0

## 2018-06-25 RX ORDER — PROMETHAZINE HYDROCHLORIDE 12.5 MG/1
TABLET ORAL
Qty: 20 TABLET | Refills: 0 | OUTPATIENT
Start: 2018-06-25

## 2019-01-26 ENCOUNTER — APPOINTMENT (OUTPATIENT)
Dept: GENERAL RADIOLOGY | Age: 49
End: 2019-01-26
Payer: MEDICARE

## 2019-01-26 ENCOUNTER — HOSPITAL ENCOUNTER (EMERGENCY)
Age: 49
Discharge: HOME | End: 2019-01-26
Attending: EMERGENCY MEDICINE
Payer: MEDICARE

## 2019-01-26 VITALS
RESPIRATION RATE: 18 BRPM | BODY MASS INDEX: 33.7 KG/M2 | WEIGHT: 215.17 LBS | SYSTOLIC BLOOD PRESSURE: 166 MMHG | HEART RATE: 89 BPM | DIASTOLIC BLOOD PRESSURE: 101 MMHG | OXYGEN SATURATION: 98 % | TEMPERATURE: 98.5 F

## 2019-01-26 DIAGNOSIS — J11.1 INFLUENZA-LIKE ILLNESS: Primary | ICD-10-CM

## 2019-01-26 LAB
A/G RATIO: 1.3 (ref 1.1–2.2)
ALBUMIN SERPL-MCNC: 4.4 G/DL (ref 3.4–5)
ALP BLD-CCNC: 83 U/L (ref 40–129)
ALT SERPL-CCNC: 13 U/L (ref 10–40)
ANION GAP SERPL CALCULATED.3IONS-SCNC: 12 MMOL/L (ref 3–16)
AST SERPL-CCNC: 12 U/L (ref 15–37)
BASOPHILS ABSOLUTE: 0 K/UL (ref 0–0.2)
BASOPHILS RELATIVE PERCENT: 0.5 %
BILIRUB SERPL-MCNC: <0.2 MG/DL (ref 0–1)
BUN BLDV-MCNC: 13 MG/DL (ref 7–20)
CALCIUM SERPL-MCNC: 10 MG/DL (ref 8.3–10.6)
CHLORIDE BLD-SCNC: 106 MMOL/L (ref 99–110)
CO2: 26 MMOL/L (ref 21–32)
CREAT SERPL-MCNC: 0.8 MG/DL (ref 0.6–1.1)
EOSINOPHILS ABSOLUTE: 0.3 K/UL (ref 0–0.6)
EOSINOPHILS RELATIVE PERCENT: 4.1 %
GFR AFRICAN AMERICAN: >60
GFR NON-AFRICAN AMERICAN: >60
GLOBULIN: 3.5 G/DL
GLUCOSE BLD-MCNC: 113 MG/DL (ref 70–99)
HCT VFR BLD CALC: 33.8 % (ref 36–48)
HEMOGLOBIN: 11.1 G/DL (ref 12–16)
LYMPHOCYTES ABSOLUTE: 2.4 K/UL (ref 1–5.1)
LYMPHOCYTES RELATIVE PERCENT: 34.4 %
MCH RBC QN AUTO: 27.9 PG (ref 26–34)
MCHC RBC AUTO-ENTMCNC: 32.8 G/DL (ref 31–36)
MCV RBC AUTO: 85.2 FL (ref 80–100)
MONOCYTES ABSOLUTE: 0.4 K/UL (ref 0–1.3)
MONOCYTES RELATIVE PERCENT: 5.8 %
NEUTROPHILS ABSOLUTE: 3.8 K/UL (ref 1.7–7.7)
NEUTROPHILS RELATIVE PERCENT: 55.2 %
PDW BLD-RTO: 12.2 % (ref 12.4–15.4)
PLATELET # BLD: 262 K/UL (ref 135–450)
PMV BLD AUTO: 8 FL (ref 5–10.5)
POTASSIUM REFLEX MAGNESIUM: 3.8 MMOL/L (ref 3.5–5.1)
RBC # BLD: 3.96 M/UL (ref 4–5.2)
SODIUM BLD-SCNC: 144 MMOL/L (ref 136–145)
TOTAL PROTEIN: 7.9 G/DL (ref 6.4–8.2)
WBC # BLD: 7 K/UL (ref 4–11)

## 2019-01-26 PROCEDURE — 71046 X-RAY EXAM CHEST 2 VIEWS: CPT

## 2019-01-26 PROCEDURE — 6370000000 HC RX 637 (ALT 250 FOR IP): Performed by: EMERGENCY MEDICINE

## 2019-01-26 PROCEDURE — 36415 COLL VENOUS BLD VENIPUNCTURE: CPT

## 2019-01-26 PROCEDURE — 99283 EMERGENCY DEPT VISIT LOW MDM: CPT

## 2019-01-26 PROCEDURE — 6360000002 HC RX W HCPCS: Performed by: EMERGENCY MEDICINE

## 2019-01-26 PROCEDURE — 2580000003 HC RX 258: Performed by: EMERGENCY MEDICINE

## 2019-01-26 PROCEDURE — 80053 COMPREHEN METABOLIC PANEL: CPT

## 2019-01-26 PROCEDURE — 96361 HYDRATE IV INFUSION ADD-ON: CPT

## 2019-01-26 PROCEDURE — 96374 THER/PROPH/DIAG INJ IV PUSH: CPT

## 2019-01-26 PROCEDURE — 85025 COMPLETE CBC W/AUTO DIFF WBC: CPT

## 2019-01-26 RX ORDER — ONDANSETRON 2 MG/ML
4 INJECTION INTRAMUSCULAR; INTRAVENOUS
Status: DISCONTINUED | OUTPATIENT
Start: 2019-01-26 | End: 2019-01-26 | Stop reason: HOSPADM

## 2019-01-26 RX ORDER — BENZONATATE 100 MG/1
100 CAPSULE ORAL 3 TIMES DAILY PRN
Status: DISCONTINUED | OUTPATIENT
Start: 2019-01-26 | End: 2019-01-26 | Stop reason: HOSPADM

## 2019-01-26 RX ORDER — BENZONATATE 100 MG/1
100 CAPSULE ORAL 3 TIMES DAILY PRN
Qty: 30 CAPSULE | Refills: 0 | Status: SHIPPED | OUTPATIENT
Start: 2019-01-26 | End: 2019-02-02

## 2019-01-26 RX ORDER — 0.9 % SODIUM CHLORIDE 0.9 %
1000 INTRAVENOUS SOLUTION INTRAVENOUS ONCE
Status: COMPLETED | OUTPATIENT
Start: 2019-01-26 | End: 2019-01-26

## 2019-01-26 RX ORDER — ONDANSETRON 4 MG/1
4 TABLET, ORALLY DISINTEGRATING ORAL 4 TIMES DAILY PRN
Qty: 30 TABLET | Refills: 0 | Status: SHIPPED | OUTPATIENT
Start: 2019-01-26 | End: 2019-02-09

## 2019-01-26 RX ADMIN — SODIUM CHLORIDE 1000 ML: 9 INJECTION, SOLUTION INTRAVENOUS at 00:52

## 2019-01-26 RX ADMIN — BENZONATATE 100 MG: 100 CAPSULE ORAL at 00:53

## 2019-01-26 RX ADMIN — ONDANSETRON 4 MG: 2 INJECTION INTRAMUSCULAR; INTRAVENOUS at 00:53

## 2019-01-26 ASSESSMENT — PAIN SCALES - GENERAL
PAINLEVEL_OUTOF10: 8
PAINLEVEL_OUTOF10: 4

## 2019-01-26 ASSESSMENT — PAIN DESCRIPTION - LOCATION: LOCATION: CHEST

## 2022-12-14 RX ORDER — NITROGLYCERIN 0.4 MG/1
TABLET SUBLINGUAL
COMMUNITY

## 2022-12-14 NOTE — PROGRESS NOTES
Called and spoke with April at [facility] office and requested the office fax the H&P, labs, and EKG that patient had completed at Dr. Ruben Ugalde' office. April stated she would send the message to Dr. Trip Hager.

## 2022-12-14 NOTE — PROGRESS NOTES
Name_______________________________________Printed:____________________  Date and time of surgery___12/20/22  4036  [facility]_____________________Arrival Time:__0915______________   1. The instructions given regarding when and if a patient needs to stop oral intake prior to surgery varies. Follow the specific instructions you were given                  _x__Nothing to eat or to drink after Midnight the night before.                   ____Carbo loading or ERAS instructions will be given to select patients-if you have been given those instructions -please do the following                           The evening before your surgery after dinner before midnight drink 40 ounces of gatorade. If you are diabetic use sugar free. The morning of surgery drink 40 ounces of water. This needs to be finished 3 hours prior to your surgery start time. 2. Take the following pills with a small sip of water on the morning of surgery: imdur, buspirone, gabapentin, amlodipine, pantoprazole, norco                  Do not take blood pressure medications ending in pril or sartan the yo prior to surgery or the morning of surgery. Dr Villaseñor Brought patient are not to take any medications the AM of surgery. 3. Aspirin, Ibuprofen, Advil, Naproxen, Vitamin E and other Anti-inflammatory products and supplements should be stopped for 5 -7days before surgery or as directed by your physician. 4. Check with your Doctor regarding stopping Plavix, Coumadin,Eliquis, Lovenox,Effient,Pradaxa,Xarelto, Fragmin or other blood thinners and follow their instructions. 5. Do not smoke, and do not drink any alcoholic beverages 24 hours prior to surgery. This includes NA Beer. Refrain from the usage of any recreational drugs. 6. You may brush your teeth and gargle the morning of surgery. DO NOT SWALLOW WATER   7. You MUST make arrangements for a responsible adult to stay on site while you are here and take you home after your surgery.  You will not be allowed to leave alone or drive yourself home. It is strongly suggested someone stay with you the first 24 hrs. Your surgery will be cancelled if you do not have a ride home. 8. A parent/legal guardian must accompany a child scheduled for surgery and plan to stay at the hospital until the child is discharged. Please do not bring other children with you. 9. Please wear simple, loose fitting clothing to the hospital.  Chapito Boo not bring valuables (money, credit cards, checkbooks, etc.) Do not wear any makeup (including no eye makeup) or nail polish on your fingers or toes. 10. DO NOT wear any jewelry or piercings on day of surgery. All body piercing jewelry must be removed. 11. If you have ___dentures, they will be removed before going to the OR; we will provide you a container. If you wear ___contact lenses or ___glasses, they will be removed; please bring a case for them. 12. Please see your family doctor/pediatrician for a history & physical and/or concerning medications. Bring any test results/reports from your physician's office. PCP__________________Phone___________H&P Appt. Date________             13 If you  have a Living Will and Durable Power of  for Healthcare, please bring in a copy. 15. Notify your Surgeon if you develop any illness between now and surgery  time, cough, cold, fever, sore throat, nausea, vomiting, etc.  Please notify your surgeon if you experience dizziness, shortness of breath or blurred vision between now & the time of your surgery             15. DO NOT shave your operative site 96 hours prior to surgery. For face & neck surgery, men may use an electric razor 48 hours prior to surgery. 16. Shower the night before or morning of surgery using an antibacterial soap or as you have been instructed. 17. To provide excellent care visitors will be limited to one in the room at any given time.              18.  Please bring picture ID and insurance card. 19.  Visit our web site for additional information:  [email or web address]              20.During flu season no children under the age of 15 are permitted in the hospital for the safety of all patients. 21. If you take a long acting insulin in the evening only  take half of your usual  dose the night  before your procedure              22. If you use a c-pap please bring DOS if staying overnight,             23.For your convenience [facility] has a pharmacy on site to fill your prescriptions. 24. If you use oxygen and have a portable tank please bring it  with you the DOS             25. Bring a complete list of all your medications with name and dose include any supplements. 26. Other__________________________________________   *Please call pre admission testing if you any further questions   [address]    [facility] [place]. [facility]  [phone]   [address] [place] [address]       VISITOR POLICY(subject to change)    Current policy is 2 visitors per patient. No children. Mask is  at the discretion of the facility. Visiting hours are 8a-8p. Overnight visitors will be at the discretion of the nurse. All policies subject to change. All above information reviewed with patient in person or by phone. Patient verbalizes understanding. All questions and concerns addressed.                                                                                                  Patient/Rep_patient___________________                                                                                                                                    PRE OP INSTRUCTIONS

## 2022-12-19 ENCOUNTER — ANESTHESIA EVENT (OUTPATIENT)
Dept: OPERATING ROOM | Age: 52
End: 2022-12-19
Payer: COMMERCIAL

## 2022-12-19 NOTE — PROGRESS NOTES
Requested Dr. Katherine Sorensen to review EKG as stated old infarct. He ordered EKG day of surgery.

## 2022-12-20 ENCOUNTER — APPOINTMENT (OUTPATIENT)
Dept: GENERAL RADIOLOGY | Age: 52
End: 2022-12-20
Attending: PODIATRIST
Payer: COMMERCIAL

## 2022-12-20 ENCOUNTER — HOSPITAL ENCOUNTER (OUTPATIENT)
Age: 52
Setting detail: OUTPATIENT SURGERY
Discharge: HOME | End: 2022-12-20
Attending: PODIATRIST | Admitting: PODIATRIST
Payer: COMMERCIAL

## 2022-12-20 ENCOUNTER — ANESTHESIA (OUTPATIENT)
Dept: OPERATING ROOM | Age: 52
End: 2022-12-20
Payer: COMMERCIAL

## 2022-12-20 VITALS
OXYGEN SATURATION: 96 % | WEIGHT: 198.3 LBS | HEIGHT: 67 IN | SYSTOLIC BLOOD PRESSURE: 125 MMHG | HEART RATE: 85 BPM | RESPIRATION RATE: 16 BRPM | DIASTOLIC BLOOD PRESSURE: 97 MMHG | BODY MASS INDEX: 31.12 KG/M2 | TEMPERATURE: 97 F

## 2022-12-20 LAB
GLUCOSE BLD-MCNC: 107 MG/DL (ref 70–99)
PERFORMED ON: ABNORMAL

## 2022-12-20 PROCEDURE — 7100000000 HC PACU RECOVERY - FIRST 15 MIN: Performed by: PODIATRIST

## 2022-12-20 PROCEDURE — 7100000011 HC PHASE II RECOVERY - ADDTL 15 MIN: Performed by: PODIATRIST

## 2022-12-20 PROCEDURE — 6360000002 HC RX W HCPCS: Performed by: NURSE ANESTHETIST, CERTIFIED REGISTERED

## 2022-12-20 PROCEDURE — 3600000004 HC SURGERY LEVEL 4 BASE: Performed by: PODIATRIST

## 2022-12-20 PROCEDURE — 2580000003 HC RX 258: Performed by: NURSE ANESTHETIST, CERTIFIED REGISTERED

## 2022-12-20 PROCEDURE — 6360000002 HC RX W HCPCS: Performed by: PODIATRIST

## 2022-12-20 PROCEDURE — 7100000001 HC PACU RECOVERY - ADDTL 15 MIN: Performed by: PODIATRIST

## 2022-12-20 PROCEDURE — C1776 JOINT DEVICE (IMPLANTABLE): HCPCS | Performed by: PODIATRIST

## 2022-12-20 PROCEDURE — 6370000000 HC RX 637 (ALT 250 FOR IP): Performed by: ANESTHESIOLOGY

## 2022-12-20 PROCEDURE — 73620 X-RAY EXAM OF FOOT: CPT

## 2022-12-20 PROCEDURE — 2709999900 HC NON-CHARGEABLE SUPPLY: Performed by: PODIATRIST

## 2022-12-20 PROCEDURE — 7100000010 HC PHASE II RECOVERY - FIRST 15 MIN: Performed by: PODIATRIST

## 2022-12-20 PROCEDURE — 2500000003 HC RX 250 WO HCPCS: Performed by: NURSE ANESTHETIST, CERTIFIED REGISTERED

## 2022-12-20 PROCEDURE — 2580000003 HC RX 258: Performed by: PODIATRIST

## 2022-12-20 PROCEDURE — 2500000003 HC RX 250 WO HCPCS: Performed by: PODIATRIST

## 2022-12-20 PROCEDURE — 3700000001 HC ADD 15 MINUTES (ANESTHESIA): Performed by: PODIATRIST

## 2022-12-20 PROCEDURE — 3600000014 HC SURGERY LEVEL 4 ADDTL 15MIN: Performed by: PODIATRIST

## 2022-12-20 PROCEDURE — 3700000000 HC ANESTHESIA ATTENDED CARE: Performed by: PODIATRIST

## 2022-12-20 PROCEDURE — C1713 ANCHOR/SCREW BN/BN,TIS/BN: HCPCS | Performed by: PODIATRIST

## 2022-12-20 DEVICE — IMPLANTABLE DEVICE: Type: IMPLANTABLE DEVICE | Site: FOOT | Status: FUNCTIONAL

## 2022-12-20 DEVICE — GRAFT SFT TISS 2 CC FLOWABLE PLCNTA MTRX VIAFLOW: Type: IMPLANTABLE DEVICE | Site: FOOT | Status: FUNCTIONAL

## 2022-12-20 RX ORDER — FENTANYL CITRATE 50 UG/ML
INJECTION, SOLUTION INTRAMUSCULAR; INTRAVENOUS PRN
Status: DISCONTINUED | OUTPATIENT
Start: 2022-12-20 | End: 2022-12-20 | Stop reason: SDUPTHER

## 2022-12-20 RX ORDER — SUCCINYLCHOLINE CHLORIDE 20 MG/ML
INJECTION INTRAMUSCULAR; INTRAVENOUS PRN
Status: DISCONTINUED | OUTPATIENT
Start: 2022-12-20 | End: 2022-12-20 | Stop reason: SDUPTHER

## 2022-12-20 RX ORDER — MIDAZOLAM HYDROCHLORIDE 1 MG/ML
INJECTION INTRAMUSCULAR; INTRAVENOUS PRN
Status: DISCONTINUED | OUTPATIENT
Start: 2022-12-20 | End: 2022-12-20 | Stop reason: SDUPTHER

## 2022-12-20 RX ORDER — PROPOFOL 10 MG/ML
INJECTION, EMULSION INTRAVENOUS PRN
Status: DISCONTINUED | OUTPATIENT
Start: 2022-12-20 | End: 2022-12-20 | Stop reason: SDUPTHER

## 2022-12-20 RX ORDER — LIDOCAINE HYDROCHLORIDE 20 MG/ML
INJECTION, SOLUTION EPIDURAL; INFILTRATION; INTRACAUDAL; PERINEURAL
Status: COMPLETED | OUTPATIENT
Start: 2022-12-20 | End: 2022-12-20

## 2022-12-20 RX ORDER — HYDROMORPHONE HCL 110MG/55ML
0.25 PATIENT CONTROLLED ANALGESIA SYRINGE INTRAVENOUS EVERY 5 MIN PRN
Status: DISCONTINUED | OUTPATIENT
Start: 2022-12-20 | End: 2022-12-20 | Stop reason: HOSPADM

## 2022-12-20 RX ORDER — LIDOCAINE HYDROCHLORIDE 10 MG/ML
0.5 INJECTION, SOLUTION EPIDURAL; INFILTRATION; INTRACAUDAL; PERINEURAL ONCE
Status: DISCONTINUED | OUTPATIENT
Start: 2022-12-20 | End: 2022-12-20 | Stop reason: HOSPADM

## 2022-12-20 RX ORDER — MEPERIDINE HYDROCHLORIDE 25 MG/ML
12.5 INJECTION INTRAMUSCULAR; INTRAVENOUS; SUBCUTANEOUS EVERY 5 MIN PRN
Status: DISCONTINUED | OUTPATIENT
Start: 2022-12-20 | End: 2022-12-20 | Stop reason: HOSPADM

## 2022-12-20 RX ORDER — HYDROMORPHONE HCL 110MG/55ML
0.5 PATIENT CONTROLLED ANALGESIA SYRINGE INTRAVENOUS EVERY 5 MIN PRN
Status: DISCONTINUED | OUTPATIENT
Start: 2022-12-20 | End: 2022-12-20 | Stop reason: HOSPADM

## 2022-12-20 RX ORDER — SODIUM CHLORIDE 9 MG/ML
INJECTION, SOLUTION INTRAVENOUS CONTINUOUS PRN
Status: DISCONTINUED | OUTPATIENT
Start: 2022-12-20 | End: 2022-12-20 | Stop reason: SDUPTHER

## 2022-12-20 RX ORDER — ONDANSETRON 2 MG/ML
INJECTION INTRAMUSCULAR; INTRAVENOUS PRN
Status: DISCONTINUED | OUTPATIENT
Start: 2022-12-20 | End: 2022-12-20 | Stop reason: SDUPTHER

## 2022-12-20 RX ORDER — SODIUM CHLORIDE 9 MG/ML
INJECTION, SOLUTION INTRAVENOUS CONTINUOUS
Status: DISCONTINUED | OUTPATIENT
Start: 2022-12-20 | End: 2022-12-20 | Stop reason: HOSPADM

## 2022-12-20 RX ORDER — OXYCODONE HYDROCHLORIDE 5 MG/1
5 TABLET ORAL
Status: COMPLETED | OUTPATIENT
Start: 2022-12-20 | End: 2022-12-20

## 2022-12-20 RX ORDER — SODIUM CHLORIDE 9 MG/ML
INJECTION, SOLUTION INTRAVENOUS PRN
Status: DISCONTINUED | OUTPATIENT
Start: 2022-12-20 | End: 2022-12-20 | Stop reason: HOSPADM

## 2022-12-20 RX ORDER — ACETAMINOPHEN 650 MG
TABLET, EXTENDED RELEASE ORAL
Status: COMPLETED | OUTPATIENT
Start: 2022-12-20 | End: 2022-12-20

## 2022-12-20 RX ORDER — SODIUM CHLORIDE 0.9 % (FLUSH) 0.9 %
5-40 SYRINGE (ML) INJECTION PRN
Status: DISCONTINUED | OUTPATIENT
Start: 2022-12-20 | End: 2022-12-20 | Stop reason: HOSPADM

## 2022-12-20 RX ORDER — SODIUM CHLORIDE 0.9 % (FLUSH) 0.9 %
5-40 SYRINGE (ML) INJECTION EVERY 12 HOURS SCHEDULED
Status: DISCONTINUED | OUTPATIENT
Start: 2022-12-20 | End: 2022-12-20 | Stop reason: HOSPADM

## 2022-12-20 RX ORDER — DEXAMETHASONE SODIUM PHOSPHATE 4 MG/ML
INJECTION, SOLUTION INTRA-ARTICULAR; INTRALESIONAL; INTRAMUSCULAR; INTRAVENOUS; SOFT TISSUE PRN
Status: DISCONTINUED | OUTPATIENT
Start: 2022-12-20 | End: 2022-12-20 | Stop reason: SDUPTHER

## 2022-12-20 RX ORDER — LIDOCAINE HYDROCHLORIDE 20 MG/ML
INJECTION, SOLUTION EPIDURAL; INFILTRATION; INTRACAUDAL; PERINEURAL PRN
Status: DISCONTINUED | OUTPATIENT
Start: 2022-12-20 | End: 2022-12-20 | Stop reason: SDUPTHER

## 2022-12-20 RX ORDER — ONDANSETRON 2 MG/ML
4 INJECTION INTRAMUSCULAR; INTRAVENOUS
Status: DISCONTINUED | OUTPATIENT
Start: 2022-12-20 | End: 2022-12-20 | Stop reason: HOSPADM

## 2022-12-20 RX ORDER — DIPHENHYDRAMINE HYDROCHLORIDE 50 MG/ML
12.5 INJECTION INTRAMUSCULAR; INTRAVENOUS
Status: DISCONTINUED | OUTPATIENT
Start: 2022-12-20 | End: 2022-12-20 | Stop reason: HOSPADM

## 2022-12-20 RX ADMIN — FENTANYL CITRATE 25 MCG: 50 INJECTION, SOLUTION INTRAMUSCULAR; INTRAVENOUS at 10:50

## 2022-12-20 RX ADMIN — CEFAZOLIN 2000 MG: 2 INJECTION, POWDER, FOR SOLUTION INTRAMUSCULAR; INTRAVENOUS at 10:09

## 2022-12-20 RX ADMIN — FENTANYL CITRATE 25 MCG: 50 INJECTION, SOLUTION INTRAMUSCULAR; INTRAVENOUS at 10:58

## 2022-12-20 RX ADMIN — FENTANYL CITRATE 50 MCG: 50 INJECTION, SOLUTION INTRAMUSCULAR; INTRAVENOUS at 10:17

## 2022-12-20 RX ADMIN — DEXAMETHASONE SODIUM PHOSPHATE 8 MG: 4 INJECTION, SOLUTION INTRAMUSCULAR; INTRAVENOUS at 10:40

## 2022-12-20 RX ADMIN — ONDANSETRON 4 MG: 2 INJECTION INTRAMUSCULAR; INTRAVENOUS at 10:40

## 2022-12-20 RX ADMIN — OXYCODONE 5 MG: 5 TABLET ORAL at 12:01

## 2022-12-20 RX ADMIN — SODIUM CHLORIDE: 9 INJECTION, SOLUTION INTRAVENOUS at 10:15

## 2022-12-20 RX ADMIN — SODIUM CHLORIDE: 9 INJECTION, SOLUTION INTRAVENOUS at 10:01

## 2022-12-20 RX ADMIN — LIDOCAINE HYDROCHLORIDE 60 MG: 20 INJECTION, SOLUTION EPIDURAL; INFILTRATION; INTRACAUDAL; PERINEURAL at 10:21

## 2022-12-20 RX ADMIN — PROPOFOL 200 MG: 10 INJECTION, EMULSION INTRAVENOUS at 10:21

## 2022-12-20 RX ADMIN — SUCCINYLCHOLINE CHLORIDE 160 MG: 20 INJECTION, SOLUTION INTRAMUSCULAR; INTRAVENOUS at 10:27

## 2022-12-20 RX ADMIN — MIDAZOLAM 2 MG: 1 INJECTION INTRAMUSCULAR; INTRAVENOUS at 10:10

## 2022-12-20 ASSESSMENT — PAIN DESCRIPTION - DESCRIPTORS: DESCRIPTORS: DISCOMFORT

## 2022-12-20 ASSESSMENT — PAIN - FUNCTIONAL ASSESSMENT: PAIN_FUNCTIONAL_ASSESSMENT: 0-10

## 2022-12-20 ASSESSMENT — PAIN SCALES - GENERAL: PAINLEVEL_OUTOF10: 9

## 2022-12-20 ASSESSMENT — PAIN DESCRIPTION - ORIENTATION: ORIENTATION: RIGHT

## 2022-12-20 ASSESSMENT — PAIN DESCRIPTION - LOCATION: LOCATION: ANKLE

## 2022-12-20 NOTE — H&P
Date of Surgery Update:  Ava Cordova was seen, history and physical examination reviewed, and patient examined by me today. There have been no significant clinical changes since the completion of the previous history and physical.    The risk, benefits, and alternatives of the proposed procedure have been explained to the patient (or appropriate guardian) and understanding verbalized. All questions answered. Patient wishes to proceed.     Electronically signed by: Mao Oswald DPM,12/20/2022,9:55 AM

## 2022-12-20 NOTE — ANESTHESIA PRE PROCEDURE
Department of Anesthesiology  Preprocedure Note       Name:  Mateo Moreno   Age:  46 y.o.  :  1970                                          MRN:  [id number]         Date:  2022      Surgeon: Noelle Arreola):  Ileana Riddle DPM    Procedure: Procedure(s):  ANDRZEJ SUBTALAR JOINT IMPLANT ARTHRORESIS    Medications prior to admission:   Prior to Admission medications    Medication Sig Start Date End Date Taking?  Authorizing Provider   insulin aspart (NOVOLOG) 100 UNIT/ML injection vial 3 times daily (before meals) Sliding scale depending on the blood sugars   Yes Historical Provider, MD   nitroGLYCERIN (NITROSTAT) 0.4 MG SL tablet nitroglycerin 0.4 mg sublingual tablet   Yes Historical Provider, MD   dapagliflozin (FARXIGA) 10 MG tablet Take 10 mg by mouth every morning   Yes Historical Provider, MD   Dulaglutide (TRULICITY) 9.33 ET/3.7QN SOPN Inject into the skin once a week    Historical Provider, MD   busPIRone (BUSPAR) 5 MG tablet Take 5 mg by mouth daily    Historical Provider, MD   ibuprofen (ADVIL) 200 MG tablet Take 4 tablets by mouth every 8 hours as needed for Pain 11/10/17 11/20/17  Geoffrey Phillips MD   lidocaine-prilocaine (EMLA) 2.5-2.5 % cream apply to the lower back twice a day as needed while using a TENS unit 17   Laina Sanchez MD   clonazePAM Daniel Iba) 1 MG tablet Take 1 tablet by mouth daily as needed for Anxiety  Patient not taking: Reported on 2022   Laina Sanchez MD   promethazine (PHENERGAN) 25 MG tablet TAKE ONE TABLET BY MOUTH TWICE A DAY AS NEEDED FOR NAUSEA 17   Laina Sanchez MD   pravastatin (PRAVACHOL) 80 MG tablet Take 1 tablet by mouth every evening 17  Laina Sanchez MD   traZODone (DESYREL) 100 MG tablet Take 2 tablets by mouth nightly  Patient not taking: Reported on 2022   Laina Sanchez MD   pantoprazole (PROTONIX) 40 MG tablet Take 1 tablet by mouth daily 17   Ralph Rodriguez MD   amLODIPine (NORVASC) 10 MG tablet Take 1 tablet by mouth daily 8/30/16   Simone Nascimento MD   potassium chloride (MICRO-K) 10 MEQ CR capsule Take 1 capsule by mouth 2 times daily (with meals) 8/30/16   Simone Nascimento MD   aspirin EC 81 MG EC tablet Take 1 tablet by mouth daily 8/30/16   Simone Nascimento MD   hydrochlorothiazide (HYDRODIURIL) 25 MG tablet Take 1 tablet by mouth daily 8/30/16   Simone Nascimento MD   isosorbide mononitrate (IMDUR) 30 MG CR tablet Take 1 tablet by mouth daily  Patient taking differently: Take 30 mg by mouth nightly 8/30/16   Simone Nascimento MD   candesartan (ATACAND) 32 MG tablet Take 1 tablet by mouth daily 8/5/16   Simone Nascimento MD   gabapentin (NEURONTIN) 800 MG tablet Take 1 tablet by mouth 3 times daily 3/14/16   Simone Nascimento MD   HYDROcodone-acetaminophen [facility]) 5-325 MG per tablet Take 1 tablet by mouth 3 times daily as needed for Pain 2/10/16   Simone Nascimento MD   vitamin D (CHOLECALCIFEROL) 1000 UNIT TABS tablet Take 1 tablet by mouth daily 2/10/16   Simone Nascimento MD       Current medications:    No current facility-administered medications for this encounter.      Current Outpatient Medications   Medication Sig Dispense Refill    insulin aspart (NOVOLOG) 100 UNIT/ML injection vial 3 times daily (before meals) Sliding scale depending on the blood sugars      nitroGLYCERIN (NITROSTAT) 0.4 MG SL tablet nitroglycerin 0.4 mg sublingual tablet      dapagliflozin (FARXIGA) 10 MG tablet Take 10 mg by mouth every morning      Dulaglutide (TRULICITY) 6.28 OL/1.2MW SOPN Inject into the skin once a week      busPIRone (BUSPAR) 5 MG tablet Take 5 mg by mouth daily      ibuprofen (ADVIL) 200 MG tablet Take 4 tablets by mouth every 8 hours as needed for Pain 120 tablet 0    lidocaine-prilocaine (EMLA) 2.5-2.5 % cream apply to the lower back twice a day as needed while using a TENS unit      clonazePAM (KLONOPIN) 1 MG tablet Take 1 tablet by mouth daily as needed for Anxiety (Patient not taking: Reported on 12/14/2022)      promethazine (PHENERGAN) 25 MG tablet TAKE ONE TABLET BY MOUTH TWICE A DAY AS NEEDED FOR NAUSEA 30 tablet 0    pravastatin (PRAVACHOL) 80 MG tablet Take 1 tablet by mouth every evening      traZODone (DESYREL) 100 MG tablet Take 2 tablets by mouth nightly (Patient not taking: Reported on 12/14/2022)      pantoprazole (PROTONIX) 40 MG tablet Take 1 tablet by mouth daily 90 tablet 3    amLODIPine (NORVASC) 10 MG tablet Take 1 tablet by mouth daily 90 tablet 3    potassium chloride (MICRO-K) 10 MEQ CR capsule Take 1 capsule by mouth 2 times daily (with meals) 180 capsule 3    aspirin EC 81 MG EC tablet Take 1 tablet by mouth daily      hydrochlorothiazide (HYDRODIURIL) 25 MG tablet Take 1 tablet by mouth daily 90 tablet 3    isosorbide mononitrate (IMDUR) 30 MG CR tablet Take 1 tablet by mouth daily (Patient taking differently: Take 30 mg by mouth nightly) 90 tablet 3    candesartan (ATACAND) 32 MG tablet Take 1 tablet by mouth daily 90 tablet 3    gabapentin (NEURONTIN) 800 MG tablet Take 1 tablet by mouth 3 times daily      HYDROcodone-acetaminophen (NORCO) 5-325 MG per tablet Take 1 tablet by mouth 3 times daily as needed for Pain      vitamin D (CHOLECALCIFEROL) 1000 UNIT TABS tablet Take 1 tablet by mouth daily         Allergies:     Allergies   Allergen Reactions    Naprosyn [Naproxen] Hives and Shortness Of Breath    Prochlorperazine Anxiety, Other (See Comments), Hives and Shortness Of Breath     Ataxia       Capsaicin Other (See Comments)     Patient denies    Compazine [Prochlorperazine Maleate] Hives    Ketorolac Tromethamine Other (See Comments)     Breathing problems    Ondansetron Hcl     Thioxanthenes      Patient denies  Patient denies      Topiramate      Suicidal thoughts and \"can't breathe\"  Suicidal thoughts and \"can't breathe\"      Adhesive Tape Hives and Rash     Skin peels    Tramadol Rash       Problem List:    Patient Active Problem List   Diagnosis Code    Bone spur M77.9  Hypoglycemia E16.2    History of DVT (deep vein thrombosis) Z86.718    Chest pain R07.9    Tachycardia R00.0    Leg swelling M79.89    Essential hypertension I10    Hyperlipidemia E78.5    Gastroesophageal reflux disease without esophagitis K21.9    Pseudoseizure F44.5    Lumbar radiculopathy M54.16    Left wrist pain M25.532    Anxiety F41.9    Generalized abdominal pain R10.84    Nausea R11.0    Intractable vomiting with nausea R11.2    Fatty liver K76.0    Unstable angina ([facility]) I20.0    Abdominal pain, left lower quadrant R10.32    Hypertensive urgency I16.0    Pseudoseizures F44.5    Diabetes mellitus type 2 in obese ([facility]) E11.69, E66.9    Pleurodynia R07.81    Abdominal adhesions K66.0    Conversion disorder F44.9    Insomnia G47.00    Type 2 diabetes mellitus without complication, with long-term current use of insulin ([facility]) E11.9, Z79.4    Obesity (BMI 30-39. 9) E66.9    Hematemesis with nausea K92.0    Gastrointestinal hemorrhage with hematemesis K92.0    Functional diarrhea K59.1    Seizure ([facility]) R56.9    Epigastric abdominal pain R10.13    Bipolar 2 disorder ([facility]) F31.81    PTSD (post-traumatic stress disorder) F43.10    Suicidal ideation R45.851    Hematochezia K92.1    Anemia D64.9    Melena K92.1       Past Medical History:        Diagnosis Date    Abdominal adhesions 05/31/2016    Anxiety     Bipolar affective disorder ([facility])     Cerebral artery occlusion with cerebral infarction ([facility]     Conversion disorder 05/31/2016    Depression     Diabetes mellitus ([facility]     PATIENT HAS AN INSULIN PUMP    DVT (deep venous thrombosis) ([facility])     x 2, age 12 and 25 secondary to [address] hypertension 02/27/2016    Fatty liver 04/06/2016    Gastroesophageal reflux disease without esophagitis 02/27/2016    Hx of blood clots     Hyperlipidemia 02/27/2016    Hypertension     Insomnia 08/30/2016    Insulin pump in place     removed 2017    Lumbar radiculopathy 03/14/2016    Ovarian cancer [facility])     age 21    Pseudoseizures     PTSD (post-traumatic stress disorder)     Seizure disorder ([facility] 02/27/2016    Seizures ([facility]     Stroke ([facility] 08/2022    caused by covid    Type 1 diabetes mellitus without complication ([facility] 71/95/8724    Type 1 diabetes mellitus without complication, with long-term current use of insulin ([facility] 08/30/2016    Type 2 diabetes mellitus without complication, with long-term current use of insulin ([facility] 08/30/2016       Past Surgical History:        Procedure Laterality Date    ABDOMINAL ADHESION SURGERY  02/26/2014    Dr. Sally Elliott Bilateral 10/22/2012    excise bone spur first metatarsal phalangeal joint left foot    APPENDECTOMY      CHOLECYSTECTOMY, LAPAROSCOPIC      COLONOSCOPY      x 2    FOOT SURGERY      right x 1, left x 1    HYSTERECTOMY (CERVIX STATUS UNKNOWN)  06/2000    [facility] - with right oophorectomy    KNEE ARTHROSCOPY Left 05/11/2017    Dr. Christiano Fleming [facility] [address] OTHER SURGICAL HISTORY      INSULIN PUMP    OTHER SURGICAL HISTORY N/A 10/27/2016    DIAGNOSTIC LAPAROSCOPY WITH LYSIS OF ADHESIONS    SALPINGO-OOPHORECTOMY  2001    [facility] - left    SHOULDER SURGERY Bilateral     x 2    TOE SURGERY  03/10/2011    excision of bone spur left hallux    TOE SURGERY  03/10/2011    Sven Brancheau arthrodesis left SUB JOINT    UPPER GASTROINTESTINAL ENDOSCOPY         Social History:    Social History     Tobacco Use    Smoking status: Never    Smokeless tobacco: Never   Substance Use Topics    Alcohol use: Yes     Comment: very rarely                                Counseling given: Not Answered      Vital Signs (Current):   Vitals:    12/14/22 1315   Weight: 190 lb (86.2 kg)   Height: 5' 7\" (1.702 m)                                              BP Readings from Last 3 Encounters:   01/26/19 (!) 166/101   06/29/18 (!) 154/94 12/27/17 122/78       NPO Status:                                                                                 BMI:   Wt Readings from Last 3 Encounters:   12/14/22 190 lb (86.2 kg)   01/26/19 215 lb 2.7 oz (97.6 kg)   12/27/17 187 lb 12.8 oz (85.2 kg)     Body mass index is 29.76 kg/m². CBC:   Lab Results   Component Value Date/Time    WBC 7.0 01/26/2019 12:50 AM    RBC 3.96 01/26/2019 12:50 AM    HGB 11.1 01/26/2019 12:50 AM    HCT 33.8 01/26/2019 12:50 AM    MCV 85.2 01/26/2019 12:50 AM    RDW 12.2 01/26/2019 12:50 AM     01/26/2019 12:50 AM       CMP:   Lab Results   Component Value Date/Time     01/26/2019 12:50 AM    K 3.8 01/26/2019 12:50 AM     01/26/2019 12:50 AM    CO2 26 01/26/2019 12:50 AM    BUN 13 01/26/2019 12:50 AM    CREATININE 0.8 01/26/2019 12:50 AM    GFRAA >60 01/26/2019 12:50 AM    GFRAA 119 11/22/2012 09:05 AM    AGRATIO 1.3 01/26/2019 12:50 AM    LABGLOM >60 01/26/2019 12:50 AM    GLUCOSE 113 01/26/2019 12:50 AM    PROT 7.9 01/26/2019 12:50 AM    PROT 6.8 11/13/2012 05:05 PM    CALCIUM 10.0 01/26/2019 12:50 AM    BILITOT <0.2 01/26/2019 12:50 AM    ALKPHOS 83 01/26/2019 12:50 AM    AST 12 01/26/2019 12:50 AM    ALT 13 01/26/2019 12:50 AM       POC Tests: No results for input(s): POCGLU, POCNA, POCK, POCCL, POCBUN, POCHEMO, POCHCT in the last 72 hours.     Coags:   Lab Results   Component Value Date/Time    PROTIME 11.6 02/17/2017 02:15 PM    INR 1.03 02/17/2017 02:15 PM    APTT 30.8 05/05/2016 01:55 PM       HCG (If Applicable):   Lab Results   Component Value Date    PREGTESTUR Negative 06/29/2018        ABGs:   Lab Results   Component Value Date/Time    PHART 7.48 11/14/2012 08:59 AM    PO2ART 122 11/14/2012 08:59 AM    XZI4ZBR 33 11/14/2012 08:59 AM    FWS9XSH 24 11/14/2012 08:59 AM    BEART 1.4 11/14/2012 08:59 AM        Type & Screen (If Applicable):  No results found for: LABABO, LABRH    Drug/Infectious Status (If Applicable):  No results found for: HIV, HEPCAB    COVID-19 Screening (If Applicable): No results found for: COVID19        Anesthesia Evaluation  Patient summary reviewed and Nursing notes reviewed  Airway: Mallampati: II  TM distance: >3 FB   Neck ROM: full  Mouth opening: > = 3 FB   Dental:          Pulmonary:                              Cardiovascular:  Exercise tolerance: good (>4 METS),   (+) hypertension: moderate, angina:,                   Neuro/Psych:   (+) seizures (pseudoseizure): well controlled, CVA: no interval change, neuromuscular disease:, psychiatric history (PTSD): stable with treatmentdepression/anxiety             GI/Hepatic/Renal:   (+) GERD: well controlled, liver disease:,           Endo/Other:    (+) DiabetesType II DM, well controlled, , .                 Abdominal:             Vascular:   + DVT, . Other Findings:           Anesthesia Plan      general     ASA 2       Induction: intravenous. MIPS: Postoperative opioids intended, Prophylactic antiemetics administered and Postoperative trial extubation. Anesthetic plan and risks discussed with patient. Plan discussed with CRNA.           Post-op pain plan if not by surgeon: single peripheral nerve block            Faisal Valenzuela MD   12/20/2022

## 2022-12-20 NOTE — BRIEF OP NOTE
Brief Postoperative Note      Patient: Rola White  YOB: 1970  MRN: [id number]    Date of Procedure: 12/20/2022    Pre-Op Diagnosis: M12.571 ARTHRITIS RIGHT    Post-Op Diagnosis: Same       Procedure(s):  ANDRZEJ SUBTALAR JOINT IMPLANT ARTHRORESIS    Surgeon(s):  Nicho Garcia DPM    Assistant:  First Assistant: Yenni Infante RN    Anesthesia: General    Estimated Blood Loss (mL): Minimal    Complications: None    Specimens:   * No specimens in log *    Implants:  Implant Name Type Inv.  Item Serial No.  Lot No. LRB No. Used Action   GRAFT SFT TISS 2 CC FLOWABLE PLCNTA MTRX VIAFLOW - [id number]  GRAFT SFT TISS 2 CC FLOWABLE PLCNTA MTRX VIAFLOW [id number] [employer]-  Right 1 Implanted   COMPONENT SUBTALAR 8MM TI BRL SHP SLT DSGN ANDRZEJ - [id number]  COMPONENT SUBTALAR 8MM TI BRL SHP SLT DSGN ANDRZEJ  [employer] [facility] [id number] Right 1 Implanted         Drains: * No LDAs found *    Findings: arthritis    Electronically signed by Nicho Garcia DPM on 12/20/2022 at 11:00 AM

## 2022-12-20 NOTE — PROGRESS NOTES
Pt arrived from OR to PACU bay 6. Report received from OR staff. Pt not yet arousable to voice. Surgical incisions dressings in place to RLE. Pt on 4L 02, NSR, and VSS. Will continue to monitor.

## 2022-12-20 NOTE — DISCHARGE INSTRUCTIONS
DR. Lesly Kaur POST OPERATIVE INSTRUCTIONS    1-Have prescription filled and take as directed. All medication should be taken with food or milk. 2-Keep foot elevated six inches above level of heart. Support feet, legs and knees with pillows. KEEP FOOT ELEVATED AS MUCH AS POSSIBLE UNTIL YOUR NEXT VISIT. 3-Place an icepack on the ankle 15 minutes every hour while awake. 4-Keep dressing clean, dry and intact. DO NOT REMOVE DRESSING. CALL OFFICE IF BANDAGE COMES OFF.    5-STAY OFF FEET EXCEPT TO GO TO THE BATHROOM    6-For the first seven days after surgery, take temperature by mouth three times a day. Call the office if greater than 101 degrees F.     7-Ambulate NWB on right with surgical shoe and crutches. 8-All instructions are to be followed until otherwise instructed by your surgeon. 9-Call our office if you have any concerns or questions. Our phones are answered 24 hours a day. 10-Your first post-op appointment is tomorrow in the office. ANESTHESIA DISCHARGE INSTRUCTIONS    Wear your seatbelt home. You are under the influence of drugs-do not drink alcohol, drive, operate machinery, make any important decisions or sign any legal documents for 24 hours. A responsible adult needs to be with you for 24 hours. You may experience lightheadedness, dizziness, or sleepiness following surgery. Rest at home today- increase activity as tolerated. Progress slowly to a regular diet unless your physician has instructed you otherwise. Drink plenty of water. If persistent nausea and vomiting becomes a problem, call your physician. Coughing, sore throat and muscle aches are other side effects of anesthesia, and should improve with time. Do not drive or operate machinery while taking narcotics.

## 2022-12-20 NOTE — PROGRESS NOTES
Discharge instructions reviewed with patient and pts  [facility] [place]. All home medications have been reviewed, pt v/u. Discharge instructions signed. Pt discharged via wheelchair. Pt discharged with crutches, post op shoe, and all belongings.  [facility] taking stable pt home.

## 2022-12-20 NOTE — ANESTHESIA POSTPROCEDURE EVALUATION
Department of Anesthesiology  Postprocedure Note    Patient: Abbey Catherine  MRN: [id number]  YOB: 1970  Date of evaluation: 12/20/2022      Procedure Summary     Date: 12/20/22 Room / Location: [address]    Anesthesia Start: 5027 Anesthesia Stop: 8302    Procedure: ANDRZEJ SUBTALAR JOINT IMPLANT [address] (Right: Foot) Diagnosis:       Traumatic arthritis of foot, right      (M12.571 ARTHRITIS RIGHT)    Surgeons: Maria Esther Gastelum DPM Responsible Provider: Yury Santos MD    Anesthesia Type: general ASA Status: 2          Anesthesia Type: No value filed.     Martha Phase I: Martha Score: 7    Martha Phase II:        Anesthesia Post Evaluation    Patient location during evaluation: PACU  Patient participation: complete - patient participated  Level of consciousness: awake and alert  Pain score: 2  Airway patency: patent  Nausea & Vomiting: no vomiting  Complications: no  Cardiovascular status: blood pressure returned to baseline  Respiratory status: acceptable  Hydration status: euvolemic  Multimodal analgesia pain management approach

## 2022-12-21 NOTE — OP NOTE
[facility]                     [address] [facility], [address]                                OPERATIVE REPORT    PATIENT NAME: ILAN WEST                       :        1970  MED REC NO:   [id number]                          ROOM:  ACCOUNT NO:   [de-identified]                           ADMIT DATE: 2022  PROVIDER:     Radha Robles DPM    DATE OF PROCEDURE:  2022    PREOPERATIVE DIAGNOSIS:  Arthritis of subtalar joint, right foot. POSTOPERATIVE DIAGNOSIS:  Arthritis of subtalar joint, right foot. PROCEDURE PERFORMED:  Subtalar joint arthrodesis implant, right. SURGEON:  Radha Robles DPM    ASSISTANT:  None. PATHOLOGY:  None. ANESTHESIA:  General.    HEMOSTASIS:  Right calf pneumatic tourniquet elevated to 250 mmHg. ESTIMATED BLOOD LOSS:  Minimal.    MATERIALS USED:  4-0 Vicryl, 5-0 nylon, and 8-mm ANDRZEJ subtalar joint  arthrodesis implant. INJECTABLE:  Preoperatively, 10 mL of the 2% lidocaine plain. Postoperatively, 5 mL of the 2% lidocaine plain. COMPLICATIONS:  None. HISTORY:  This is a 59-year-old female, date of birth 1970, who  had originally presented to my office with severe right subtalar joint  pain. This has been bothering her for a long period of time, been  getting a little bit worse and more sore. Conservative therapy  including padding, strapping as well as cortisone injections have failed  to relieve the patient's pain. The procedure and complications was  explained at length to the patient. The patient accepted these risks  and is prepared for surgery today. Preoperative x-ray did reveal  arthritis within the subtalar joint and this is on the right foot. OPERATIVE PROCEDURE:  Attention was then directed to the lateral sinus  tarsi on this right foot. A 3-cm vertical incision was made. This was  deepened via sharp and blunt dissection down to the level of the middle  facet.   The probe was then used to free up any of the osseous fusion  material within the subtalar joint at this time. Next, a 6 and then an  8 mm trial expanders were then placed within the subtalar joint. The 8  mm appeared to be the most appropriate at this time. A K-wire was also  placed for guided placement of the implant. An 8-mm trial implant was  then placed within the middle facet. Good overall position. Intraoperative C-arm was also used at this time. Next, an 8-mm  permanent ANDRZEJ subtalar joint arthrodesis implant was then placed within  the middle facet. Posterior aspect of the implant was placed at the  lateral wall of the talus at this time. Good overall position was noted  on C-arm as well. K-wire was removed. The entire incision site was  irrigated with copious amounts of sterile saline. Deep tissue was  reapproximated with the use of a 4-0 Vicryl as well as the subcuticular  fashion at the level of the skin and the outer skin layer was  reapproximated with the use of a 5-0 nylon. This was then injected with  5 mL of the 2% lidocaine plain and then dressed with Betadine-soaked  Adaptic, 4x4s, two Kerlix and an Ace wrap on this right foot. The  previously placed right calf pneumatic tourniquet was quickly deflated. Color and temperature returned to all digits of this right foot  indicating adequate neurovascular status. The patient left the  operating room with vital signs stable and in satisfactory condition. She is to follow up tomorrow in my office for bandage change.         Nicholas Stbubs DPM    D: 12/20/2022 11:07:50       T: 12/20/2022 19:58:19     [email or web address]  Job#: [id number]     Doc#: [id number]    CC:

## 2023-03-06 ENCOUNTER — HOSPITAL ENCOUNTER (OUTPATIENT)
Age: 53
Setting detail: OUTPATIENT SURGERY
Discharge: HOME | End: 2023-03-06
Attending: INTERNAL MEDICINE | Admitting: INTERNAL MEDICINE
Payer: COMMERCIAL

## 2023-03-06 ENCOUNTER — ANESTHESIA EVENT (OUTPATIENT)
Dept: ENDOSCOPY | Age: 53
End: 2023-03-06
Payer: COMMERCIAL

## 2023-03-06 ENCOUNTER — ANESTHESIA (OUTPATIENT)
Dept: ENDOSCOPY | Age: 53
End: 2023-03-06
Payer: COMMERCIAL

## 2023-03-06 VITALS
WEIGHT: 198.6 LBS | RESPIRATION RATE: 16 BRPM | DIASTOLIC BLOOD PRESSURE: 84 MMHG | HEIGHT: 67 IN | SYSTOLIC BLOOD PRESSURE: 132 MMHG | BODY MASS INDEX: 31.17 KG/M2 | TEMPERATURE: 98 F | HEART RATE: 90 BPM | OXYGEN SATURATION: 100 %

## 2023-03-06 DIAGNOSIS — K62.5 RECTAL BLEEDING: ICD-10-CM

## 2023-03-06 DIAGNOSIS — D64.9 ANEMIA, UNSPECIFIED TYPE: Primary | ICD-10-CM

## 2023-03-06 DIAGNOSIS — K92.0 HEMATEMESIS, UNSPECIFIED WHETHER NAUSEA PRESENT: ICD-10-CM

## 2023-03-06 LAB
GLUCOSE BLD-MCNC: 125 MG/DL (ref 70–99)
GLUCOSE BLD-MCNC: 55 MG/DL (ref 70–99)
PERFORMED ON: ABNORMAL
PERFORMED ON: ABNORMAL

## 2023-03-06 PROCEDURE — 3609027000 HC COLONOSCOPY: Performed by: INTERNAL MEDICINE

## 2023-03-06 PROCEDURE — 6360000002 HC RX W HCPCS: Performed by: NURSE ANESTHETIST, CERTIFIED REGISTERED

## 2023-03-06 PROCEDURE — 3609012400 HC EGD TRANSORAL BIOPSY SINGLE/MULTIPLE: Performed by: INTERNAL MEDICINE

## 2023-03-06 PROCEDURE — 3700000000 HC ANESTHESIA ATTENDED CARE: Performed by: INTERNAL MEDICINE

## 2023-03-06 PROCEDURE — 2500000003 HC RX 250 WO HCPCS: Performed by: STUDENT IN AN ORGANIZED HEALTH CARE EDUCATION/TRAINING PROGRAM

## 2023-03-06 PROCEDURE — 7100000011 HC PHASE II RECOVERY - ADDTL 15 MIN: Performed by: INTERNAL MEDICINE

## 2023-03-06 PROCEDURE — 2709999900 HC NON-CHARGEABLE SUPPLY: Performed by: INTERNAL MEDICINE

## 2023-03-06 PROCEDURE — 7100000010 HC PHASE II RECOVERY - FIRST 15 MIN: Performed by: INTERNAL MEDICINE

## 2023-03-06 PROCEDURE — 88305 TISSUE EXAM BY PATHOLOGIST: CPT

## 2023-03-06 PROCEDURE — 2580000003 HC RX 258: Performed by: STUDENT IN AN ORGANIZED HEALTH CARE EDUCATION/TRAINING PROGRAM

## 2023-03-06 PROCEDURE — 6370000000 HC RX 637 (ALT 250 FOR IP): Performed by: STUDENT IN AN ORGANIZED HEALTH CARE EDUCATION/TRAINING PROGRAM

## 2023-03-06 PROCEDURE — 3700000001 HC ADD 15 MINUTES (ANESTHESIA): Performed by: INTERNAL MEDICINE

## 2023-03-06 RX ORDER — SODIUM CHLORIDE 0.9 % (FLUSH) 0.9 %
5-40 SYRINGE (ML) INJECTION PRN
Status: DISCONTINUED | OUTPATIENT
Start: 2023-03-06 | End: 2023-03-06 | Stop reason: HOSPADM

## 2023-03-06 RX ORDER — PROPOFOL 10 MG/ML
INJECTION, EMULSION INTRAVENOUS CONTINUOUS PRN
Status: DISCONTINUED | OUTPATIENT
Start: 2023-03-06 | End: 2023-03-06 | Stop reason: SDUPTHER

## 2023-03-06 RX ORDER — TRISODIUM CITRATE DIHYDRATE AND CITRIC ACID MONOHYDRATE 500; 334 MG/5ML; MG/5ML
30 SOLUTION ORAL ONCE
Status: COMPLETED | OUTPATIENT
Start: 2023-03-06 | End: 2023-03-06

## 2023-03-06 RX ORDER — SODIUM CHLORIDE 0.9 % (FLUSH) 0.9 %
5-40 SYRINGE (ML) INJECTION EVERY 12 HOURS SCHEDULED
Status: DISCONTINUED | OUTPATIENT
Start: 2023-03-06 | End: 2023-03-06 | Stop reason: HOSPADM

## 2023-03-06 RX ORDER — DEXTROSE MONOHYDRATE 25 G/50ML
12.5 INJECTION, SOLUTION INTRAVENOUS ONCE
Status: COMPLETED | OUTPATIENT
Start: 2023-03-06 | End: 2023-03-06

## 2023-03-06 RX ORDER — FAMOTIDINE 10 MG/ML
20 INJECTION, SOLUTION INTRAVENOUS
Status: COMPLETED | OUTPATIENT
Start: 2023-03-06 | End: 2023-03-06

## 2023-03-06 RX ORDER — DEXTROSE MONOHYDRATE 100 MG/ML
INJECTION, SOLUTION INTRAVENOUS CONTINUOUS PRN
Status: DISCONTINUED | OUTPATIENT
Start: 2023-03-06 | End: 2023-03-06 | Stop reason: HOSPADM

## 2023-03-06 RX ORDER — SODIUM CHLORIDE 9 MG/ML
INJECTION, SOLUTION INTRAVENOUS PRN
Status: DISCONTINUED | OUTPATIENT
Start: 2023-03-06 | End: 2023-03-06 | Stop reason: HOSPADM

## 2023-03-06 RX ADMIN — SODIUM CITRATE AND CITRIC ACID MONOHYDRATE 30 ML: 500; 334 SOLUTION ORAL at 13:42

## 2023-03-06 RX ADMIN — PROPOFOL 150 MCG/KG/MIN: 10 INJECTION, EMULSION INTRAVENOUS at 13:50

## 2023-03-06 RX ADMIN — FAMOTIDINE 20 MG: 10 INJECTION, SOLUTION INTRAVENOUS at 13:42

## 2023-03-06 RX ADMIN — SODIUM CHLORIDE: 9 INJECTION, SOLUTION INTRAVENOUS at 13:37

## 2023-03-06 RX ADMIN — DEXTROSE MONOHYDRATE 12.5 G: 25 INJECTION, SOLUTION INTRAVENOUS at 13:38

## 2023-03-06 ASSESSMENT — PAIN DESCRIPTION - DESCRIPTORS: DESCRIPTORS: CRAMPING

## 2023-03-06 ASSESSMENT — PAIN - FUNCTIONAL ASSESSMENT: PAIN_FUNCTIONAL_ASSESSMENT: 0-10

## 2023-03-06 ASSESSMENT — LIFESTYLE VARIABLES: SMOKING_STATUS: 0

## 2023-03-06 NOTE — DISCHARGE INSTRUCTIONS

## 2023-03-06 NOTE — OP NOTE
Endoscopy Note    Patient: Christi Kwon  : 1970  Acct#:     Procedure: Esophagogastroduodenoscopy with biopsy                       Colonoscopy    Date:  3/6/2023     Surgeon:   Lele López MD    Referring Physician:  Ronald Lawler MD    Previous Colonoscopy: YES  Date:    Greater than 3 years: NO    Indications: This is a 48y.o. year old female who presents today with hematemesis and rectal bleeding. EGD and colonoscopy for further evaluation. Postoperative Diagnosis:  Tattoo in the distal duodenum. Internal hemorrhoids    Anesthesia:  MAC, see anesthesia documentation     Estimated Blood Loss (mL): minimal     Complications: None    Specimens:   ID Type Source Tests Collected by Time Destination   A : Gastric BX r/o h.pylori Tissue Tissue SURGICAL PATHOLOGY Lele López MD 3/6/2023 1400        Consent:  The patient or their legal guardian has signed a consent, and is aware of the potential risks, benefits, alternatives, and potential complications of this procedure. These include, but are not limited to hemorrhage, bleeding, post procedural pain, perforation, phlebitis, aspiration, hypotension, hypoxia, cardiovascular events such as arryhthmia, and possibly death. Additionally, the possibility of missed colonic polyps and interval colon cancer was discussed in the consent. Description of Procedure: The patient was then taken to the endoscopy suite, placed in the left lateral decubitus position and the above IV sedation was administrered. The Olympus video endoscope was placed through the patient's oropharynx without difficulty to the extent of the 2nd portion of the duodenum. Both forward and retroflexed views of the stomach were obtained. Findings:    Esophagus: The esophagus appeared normal without evidence of Acuna's esophagus or reflux esophagitis. Stomach:  The stomach appeared normal on forward and retroflexed views  Duodenum: The first and 2nd portions of the duodenum appeared normal with normal villous pattern. Tattoo seen in the distal esophagus with no visible mucosal abnormalities within the limits of the upper endoscopy.    The scope was then withdrawn back into the stomach, it was decompressed, and the scope was completely withdrawn.    A digital rectal examination was performed and revealed negative without mass, lesions or tenderness.      The Olympus video colonoscope was placed in the patient's rectum under digital direction and advanced to the cecum. The cecum was identified by characteristic anatomy and ballottment.  The prep was fair.  The ileocecal valve was identified.     The terminal ileum was unremarkable.    The scope was then withdrawn back through the cecum, ascending, transverse, descending, sigmoid colon, and rectum.  Careful circumferential examination of the mucosa in these areas demonstrated normal colonic mucosa throughout.   The scope was then withdrawn into the rectum and retroflexed.  The retroflexed view of the anal verge and rectum demonstrates moderate internal hemorrhoids.      The scope was straightened, the colon was decompressed and the scope was withdrawn from the patient.  The patient tolerated the procedure well and was taken to the PACU in good condition.    Estimated Blood Loss (mL): minimal     Complications: None    Specimens:   ID Type Source Tests Collected by Time Destination   A : Gastric BX r/o h.pylori Tissue Tissue SURGICAL PATHOLOGY Yaneth Archer MD 3/6/2023 1400          Impression:    Tattoo in the distal duodenum. Internal hemorrhoids    Recommendations:   Call for results in 15 days  High fiber diet  Repeat colonoscopy in 5 years with two days of clear liquids for history of colon polyps and fair prep  Referral to hematology    Yaneth Archer MD  [facility]  [phone]

## 2023-03-06 NOTE — ANESTHESIA POSTPROCEDURE EVALUATION
Department of Anesthesiology  Postprocedure Note    Patient: Radha Kaufman  MRN: [id number]  YOB: 1970  Date of evaluation: 3/6/2023      Procedure Summary     Date: 03/06/23 Room / Location: [place] [address] / [facility]    Anesthesia Start: 1348 Anesthesia Stop: 1427    Procedures:       COLONOSCOPY DIAGNOSTIC      EGD BIOPSY Diagnosis:       Rectal bleeding      Hematemesis, unspecified whether nausea present      (Rectal bleeding, Hematemesis)    Surgeons: Yaneth Archer MD Responsible Provider: Jacques Delong MD    Anesthesia Type: MAC ASA Status: 3          Anesthesia Type: MAC    Martha Phase I: Martha Score: 10    Martha Phase II: Martha Score: 10      Anesthesia Post Evaluation    Patient location during evaluation: PACU  Patient participation: complete - patient participated  Level of consciousness: awake  Airway patency: patent  Nausea & Vomiting: no nausea and no vomiting  Complications: no  Cardiovascular status: hemodynamically stable and blood pressure returned to baseline  Respiratory status: spontaneous ventilation, nonlabored ventilation and room air  Hydration status: stable  Comments: Ms. Kaufman was seen resting comfortably following procedure. Appropriate for discharge home with .

## 2023-03-06 NOTE — H&P
Gastroenterology History and Physical         Patient: Prisca Del Cid    MRN: [id number]    Sex: female    YOB: 1970    Age: 48 y.o. Location: [address] [place] [address]         Date:3/6/2023    Primary Care Physician: Evelina Llanos MD             Patient: Prisca Del Cid         Physician: Rachael Dorsey MD    Rectal bleeding, Hematemesis             Vital Signs: BP (!) 141/84   Pulse 71   Temp 98 °F (36.7 °C) (Temporal)   Resp 18   Ht 5' 7\" (1.702 m)   Wt 198 lb 9.6 oz (90.1 kg)   SpO2 100%   BMI 31.11 kg/m²          Allergies: Allergies   Allergen Reactions    Naprosyn [Naproxen] Hives and Shortness Of Breath    Prochlorperazine Anxiety, Other (See Comments), Hives and Shortness Of Breath     Ataxia       Capsaicin Other (See Comments)     Patient denies    Compazine [Prochlorperazine Maleate] Hives    Ketorolac Tromethamine Other (See Comments)     Breathing problems    Ondansetron Hcl     Thioxanthenes      Patient denies  Patient denies      Topiramate      Suicidal thoughts and \"can't breathe\"  Suicidal thoughts and \"can't breathe\"      Adhesive Tape Hives and Rash     Skin peels    Tramadol Rash            History of Present Illness: 48 y.o. female presents for EGD and colonoscopy for hematemesis and rectal bleeding. Prior evaluation including EGD and capsule two months ago were unremarkable.           History:    Past Medical History:   Diagnosis Date    Abdominal adhesions 05/31/2016    Anxiety     Bipolar affective disorder ([facility]     Cerebral artery occlusion with cerebral infarction ([facility]     Conversion disorder 05/31/2016    Depression     Diabetes mellitus ([facility]     PATIENT HAS AN INSULIN PUMP    DVT (deep venous thrombosis) (HCC)     x 2, age 12 and 25 secondary to OC    Essential hypertension 02/27/2016    Fatty liver 04/06/2016    Gastroesophageal reflux disease without esophagitis 02/27/2016    Hx of blood clots     Hyperlipidemia 02/27/2016    Hypertension     Insomnia 08/30/2016    Insulin pump in place     removed 2017    Lumbar radiculopathy 03/14/2016    Ovarian cancer [facility])     age 21    PONV (postoperative nausea and vomiting)     Pseudoseizures     PTSD (post-traumatic stress disorder)     Seizure disorder ([address] 02/27/2016    Seizures ([address]     Stroke ([address] 08/2022    caused by covid    Type 1 diabetes mellitus without complication ([address] 45/86/7402    Type 1 diabetes mellitus without complication, with long-term current use of insulin ([address] 08/30/2016    Type 2 diabetes mellitus without complication, with long-term current use of insulin ([address] 08/30/2016       Past Surgical History:   Procedure Laterality Date    ABDOMINAL ADHESION SURGERY  02/26/2014    Dr. Charlaine Harada Right     screws    ANKLE SURGERY Bilateral 10/22/2012    excise bone spur first metatarsal phalangeal joint left foot    APPENDECTOMY      CHOLECYSTECTOMY, LAPAROSCOPIC      COLONOSCOPY      x 2    FOOT SURGERY      right x 1, left x 1    FOOT SURGERY Right 12/20/2022    ANDRZEJ SUBTALAR JOINT IMPLANT ARTHRORESIS performed by Castillo Andres DPM at [address] [facility] ([place])  06/2000    [facility] - with right oophorectomy    KNEE ARTHROSCOPY Left 05/11/2017    Dr. Thuy Ventura [facility] [address] [place] [address] HISTORY      INSULIN PUMP    OTHER SURGICAL HISTORY N/A 10/27/2016    DIAGNOSTIC LAPAROSCOPY WITH LYSIS OF ADHESIONS    SALPINGO-OOPHORECTOMY  2001    [facility] - left    SHOULDER SURGERY Bilateral     x 2    TOE SURGERY  03/10/2011    excision of bone spur left hallux    TOE SURGERY  03/10/2011    Sven Brancheau arthrodesis left SUB JOINT    UPPER GASTROINTESTINAL ENDOSCOPY         Social History     Socioeconomic History    Marital status:      Spouse name: Lali Perez    Number of children: 2    Years of education: None    Highest education level: None   Occupational History    Occupation: disability Comment: bipolar   Tobacco Use    Smoking status: Never    Smokeless tobacco: Never   Vaping Use    Vaping Use: Never used   Substance and Sexual Activity    Alcohol use: Yes     Comment: very rarely    Drug use: No    Sexual activity: Yes     Partners: Male     Comment:  - Kesha Amin - 1992       Family History   Problem Relation Age of Onset    Diabetes Mother     Heart Disease Mother     Stroke Mother     High Blood Pressure Mother     Emphysema Father     Liver Disease Father     High Blood Pressure Father     Heart Disease Father     Stroke Father     Colon Cancer Father     Cancer Maternal Aunt         uterine, breast    High Blood Pressure Sister             Medications:   Prior to Admission medications    Medication Sig Start Date End Date Taking?  Authorizing Provider   insulin aspart (NOVOLOG) 100 UNIT/ML injection vial 3 times daily (before meals) Sliding scale depending on the blood sugars    Historical Provider, MD   nitroGLYCERIN (NITROSTAT) 0.4 MG SL tablet nitroglycerin 0.4 mg sublingual tablet    Historical Provider, MD   dapagliflozin (FARXIGA) 10 MG tablet Take 10 mg by mouth every morning    Historical Provider, MD   Dulaglutide 0.75 MG/0.5ML SOPN Inject into the skin once a week    Historical Provider, MD   busPIRone (BUSPAR) 5 MG tablet Take 5 mg by mouth daily    Historical Provider, MD   ibuprofen (ADVIL) 200 MG tablet Take 4 tablets by mouth every 8 hours as needed for Pain 11/10/17 11/20/17  Nelyl Puente MD   lidocaine-prilocaine (EMLA) 2.5-2.5 % cream apply to the lower back twice a day as needed while using a TENS unit  Patient not taking: Reported on 3/6/2023 9/27/17   Hawk Stroud MD   clonazePAM (KLONOPIN) 1 MG tablet Take 1 tablet by mouth daily as needed for Anxiety  Patient not taking: No sig reported 9/27/17   Hawk Stroud MD   promethazine (PHENERGAN) 25 MG tablet TAKE ONE TABLET BY MOUTH TWICE A DAY AS NEEDED FOR NAUSEA 9/21/17   Hawk Stroud MD   pravastatin (PRAVACHOL) 80 MG tablet Take 1 tablet by mouth every evening 4/21/17 4/21/18  Harris Ching MD   traZODone (DESYREL) 100 MG tablet Take 2 tablets by mouth nightly  Patient not taking: No sig reported 4/21/17   Harris Ching MD   pantoprazole (PROTONIX) 40 MG tablet Take 1 tablet by mouth daily 2/22/17   Ralph Soto MD   amLODIPine (NORVASC) 10 MG tablet Take 1 tablet by mouth daily 8/30/16   Harris Ching MD   potassium chloride (MICRO-K) 10 MEQ CR capsule Take 1 capsule by mouth 2 times daily (with meals) 8/30/16   Harris Ching MD   aspirin EC 81 MG EC tablet Take 1 tablet by mouth daily  Patient not taking: No sig reported 8/30/16   Harris Ching MD   hydrochlorothiazide (HYDRODIURIL) 25 MG tablet Take 1 tablet by mouth daily 8/30/16   Harris Ching MD   isosorbide mononitrate (IMDUR) 30 MG CR tablet Take 1 tablet by mouth daily  Patient taking differently: Take 30 mg by mouth nightly 8/30/16   Harris Ching MD   candesartan (ATACAND) 32 MG tablet Take 1 tablet by mouth daily 8/5/16   Harris Ching MD   gabapentin (NEURONTIN) 800 MG tablet Take 1 tablet by mouth 3 times daily 3/14/16   Harris Ching MD   HYDROcodone-acetaminophen [facility]) 5-325 MG per tablet Take 1 tablet by mouth 3 times daily as needed for Pain 2/10/16   Harris Ching MD   vitamin D (CHOLECALCIFEROL) 1000 UNIT TABS tablet Take 1 tablet by mouth daily 2/10/16   Harris Ching MD       ROS: the patient denies stiff neck, double vision, ataxia,  chest pain, orthopnea, sob, wheezing, productive cough    Physical Exam: I personally examined the patient.     Heart: within normal limits    Lungs: no respiratory distress    Mental status:  Within normal limits    Mallampati score: III       Planned Procedure: EGD and colonoscopy         Signed By:    Faiza Moreno MD         March 6, 2023

## 2023-03-06 NOTE — ANESTHESIA PRE PROCEDURE
Department of Anesthesiology  Preprocedure Note       Name:  Marco Lombardi   Age:  48 y.o.  :  1970                                          MRN:  [id number]         Date:  3/6/2023      Surgeon: Radha Gipson):  Elbret Jay MD    Procedure: Procedure(s):  COLONOSCOPY DIAGNOSTIC  EGD ESOPHAGOGASTRODUODENOSCOPY    Medications prior to admission:   Prior to Admission medications    Medication Sig Start Date End Date Taking?  Authorizing Provider   insulin aspart (NOVOLOG) 100 UNIT/ML injection vial 3 times daily (before meals) Sliding scale depending on the blood sugars    Historical Provider, MD   nitroGLYCERIN (NITROSTAT) 0.4 MG SL tablet nitroglycerin 0.4 mg sublingual tablet    Historical Provider, MD   dapagliflozin (FARXIGA) 10 MG tablet Take 10 mg by mouth every morning    Historical Provider, MD   Dulaglutide 0.75 MG/0.5ML SOPN Inject into the skin once a week    Historical Provider, MD   busPIRone (BUSPAR) 5 MG tablet Take 5 mg by mouth daily    Historical Provider, MD   ibuprofen (ADVIL) 200 MG tablet Take 4 tablets by mouth every 8 hours as needed for Pain 11/10/17 11/20/17  Edilma Cooney MD   lidocaine-prilocaine (EMLA) 2.5-2.5 % cream apply to the lower back twice a day as needed while using a TENS unit  Patient not taking: Reported on 3/6/2023 9/27/17   Comfort Flannery MD   clonazePAM (KLONOPIN) 1 MG tablet Take 1 tablet by mouth daily as needed for Anxiety  Patient not taking: No sig reported 17   Comfort Flannery MD   promethazine (PHENERGAN) 25 MG tablet TAKE ONE TABLET BY MOUTH TWICE A DAY AS NEEDED FOR NAUSEA 17   Comfort Flannery MD   pravastatin (PRAVACHOL) 80 MG tablet Take 1 tablet by mouth every evening 17  Comfort Flannery MD   traZODone (DESYREL) 100 MG tablet Take 2 tablets by mouth nightly  Patient not taking: No sig reported 17   Comfort Flannery MD   pantoprazole (PROTONIX) 40 MG tablet Take 1 tablet by mouth daily 17   Denisse Busby MD   amLODIPine (NORVASC) 10 MG tablet Take 1 tablet by mouth daily 8/30/16   Jevon Wiley MD   potassium chloride (MICRO-K) 10 MEQ CR capsule Take 1 capsule by mouth 2 times daily (with meals) 8/30/16   Jevon Wiley MD   aspirin EC 81 MG EC tablet Take 1 tablet by mouth daily  Patient not taking: Reported on 3/6/2023 8/30/16   Jevon Wiley MD   hydrochlorothiazide (HYDRODIURIL) 25 MG tablet Take 1 tablet by mouth daily 8/30/16   Jevon Wiley MD   isosorbide mononitrate (IMDUR) 30 MG CR tablet Take 1 tablet by mouth daily  Patient taking differently: Take 30 mg by mouth nightly 8/30/16   Jevon Wiley MD   candesartan (ATACAND) 32 MG tablet Take 1 tablet by mouth daily 8/5/16   Jevon Wiley MD   gabapentin (NEURONTIN) 800 MG tablet Take 1 tablet by mouth 3 times daily 3/14/16   Jevon Wiley MD   HYDROcodone-acetaminophen [facility]) 5-325 MG per tablet Take 1 tablet by mouth 3 times daily as needed for Pain 2/10/16   Jevon Wiley MD   vitamin D (CHOLECALCIFEROL) 1000 UNIT TABS tablet Take 1 tablet by mouth daily 2/10/16   Jevon Wiley MD       Current medications:    No current facility-administered medications for this encounter.      Current Outpatient Medications   Medication Sig Dispense Refill    insulin aspart (NOVOLOG) 100 UNIT/ML injection vial 3 times daily (before meals) Sliding scale depending on the blood sugars      nitroGLYCERIN (NITROSTAT) 0.4 MG SL tablet nitroglycerin 0.4 mg sublingual tablet      dapagliflozin (FARXIGA) 10 MG tablet Take 10 mg by mouth every morning      Dulaglutide 0.75 MG/0.5ML SOPN Inject into the skin once a week      busPIRone (BUSPAR) 5 MG tablet Take 5 mg by mouth daily      ibuprofen (ADVIL) 200 MG tablet Take 4 tablets by mouth every 8 hours as needed for Pain 120 tablet 0    lidocaine-prilocaine (EMLA) 2.5-2.5 % cream apply to the lower back twice a day as needed while using a TENS unit (Patient not taking: Reported on 3/6/2023)      clonazePAM (KLONOPIN) 1 MG tablet Take 1 tablet by mouth daily as needed for Anxiety (Patient not taking: No sig reported)      promethazine (PHENERGAN) 25 MG tablet TAKE ONE TABLET BY MOUTH TWICE A DAY AS NEEDED FOR NAUSEA 30 tablet 0    pravastatin (PRAVACHOL) 80 MG tablet Take 1 tablet by mouth every evening      traZODone (DESYREL) 100 MG tablet Take 2 tablets by mouth nightly (Patient not taking: No sig reported)      pantoprazole (PROTONIX) 40 MG tablet Take 1 tablet by mouth daily 90 tablet 3    amLODIPine (NORVASC) 10 MG tablet Take 1 tablet by mouth daily 90 tablet 3    potassium chloride (MICRO-K) 10 MEQ CR capsule Take 1 capsule by mouth 2 times daily (with meals) 180 capsule 3    aspirin EC 81 MG EC tablet Take 1 tablet by mouth daily (Patient not taking: Reported on 3/6/2023)      hydrochlorothiazide (HYDRODIURIL) 25 MG tablet Take 1 tablet by mouth daily 90 tablet 3    isosorbide mononitrate (IMDUR) 30 MG CR tablet Take 1 tablet by mouth daily (Patient taking differently: Take 30 mg by mouth nightly) 90 tablet 3    candesartan (ATACAND) 32 MG tablet Take 1 tablet by mouth daily 90 tablet 3    gabapentin (NEURONTIN) 800 MG tablet Take 1 tablet by mouth 3 times daily      HYDROcodone-acetaminophen (NORCO) 5-325 MG per tablet Take 1 tablet by mouth 3 times daily as needed for Pain      vitamin D (CHOLECALCIFEROL) 1000 UNIT TABS tablet Take 1 tablet by mouth daily         Allergies:     Allergies   Allergen Reactions    Naprosyn [Naproxen] Hives and Shortness Of Breath    Prochlorperazine Anxiety, Other (See Comments), Hives and Shortness Of Breath     Ataxia       Capsaicin Other (See Comments)     Patient denies    Compazine [Prochlorperazine Maleate] Hives    Ketorolac Tromethamine Other (See Comments)     Breathing problems    Ondansetron Hcl     Thioxanthenes      Patient denies  Patient denies      Topiramate      Suicidal thoughts and \"can't breathe\"  Suicidal thoughts and \"can't breathe\"      Adhesive Tape Hives and Rash     Skin peels    Tramadol Rash       Problem List:    Patient Active Problem List   Diagnosis Code    Bone spur M77.9    Hypoglycemia E16.2    History of DVT (deep vein thrombosis) Z86.718    Chest pain R07.9    Tachycardia R00.0    Leg swelling M79.89    Essential hypertension I10    Hyperlipidemia E78.5    Gastroesophageal reflux disease without esophagitis K21.9    Pseudoseizure F44.5    Lumbar radiculopathy M54.16    Left wrist pain M25.532    Anxiety F41.9    Generalized abdominal pain R10.84    Nausea R11.0    Intractable vomiting with nausea R11.2    Fatty liver K76.0    Unstable angina ([facility]) I20.0    Abdominal pain, left lower quadrant R10.32    Hypertensive urgency I16.0    Pseudoseizures F44.5    Diabetes mellitus type 2 in obese ([facility]) E11.69, E66.9    Pleurodynia R07.81    Abdominal adhesions K66.0    Conversion disorder F44.9    Insomnia G47.00    Type 2 diabetes mellitus without complication, with long-term current use of insulin ([facility]) E11.9, Z79.4    Obesity (BMI 30-39. 9) E66.9    Hematemesis with nausea K92.0    Gastrointestinal hemorrhage with hematemesis K92.0    Functional diarrhea K59.1    Seizure ([facility]) R56.9    Epigastric abdominal pain R10.13    Bipolar 2 disorder ([facility]) F31.81    PTSD (post-traumatic stress disorder) F43.10    Suicidal ideation R45.851    Hematochezia K92.1    Anemia D64.9    Melena K92.1       Past Medical History:        Diagnosis Date    Abdominal adhesions 05/31/2016    Anxiety     Bipolar affective disorder ([facility])     [facility]     Conversion disorder 05/31/2016    Depression     Diabetes mellitus ([facility]     PATIENT HAS AN INSULIN PUMP    DVT (deep venous thrombosis) ([facility])     x 2, age 12 and 25 secondary to [address] hypertension 02/27/2016    Fatty liver 04/06/2016    Gastroesophageal reflux disease without esophagitis 02/27/2016    Hx of blood clots     Hyperlipidemia 02/27/2016    Hypertension     Insomnia 08/30/2016    Insulin pump in place     removed 2017    Lumbar radiculopathy 03/14/2016    Ovarian cancer [facility])     age 21    Pseudoseizures     PTSD (post-traumatic stress disorder)     Seizure disorder ([address] 02/27/2016    Seizures ([address]     Stroke ([address] 08/2022    caused by covid    Type 1 diabetes mellitus without complication ([address] 56/66/1732    Type 1 diabetes mellitus without complication, with long-term current use of insulin ([address] 08/30/2016    Type 2 diabetes mellitus without complication, with long-term current use of insulin ([address] 08/30/2016       Past Surgical History:        Procedure Laterality Date    ABDOMINAL ADHESION SURGERY  02/26/2014    Dr. Worley Dakin Bilateral 10/22/2012    excise bone spur first metatarsal phalangeal joint left foot    APPENDECTOMY      CHOLECYSTECTOMY, LAPAROSCOPIC      COLONOSCOPY      x 2    FOOT SURGERY      right x 1, left x 1    FOOT SURGERY Right 12/20/2022    ANDRZEJ SUBTALAR JOINT IMPLANT ARTHRORESIS performed by Mirella Posadas DPM at [place] [address] ([facility])  06/2000    [facility] - with right oophorectomy    KNEE ARTHROSCOPY Left 05/11/2017    Dr. Bismark Teresa [facility] [address] OTHER SURGICAL HISTORY      INSULIN PUMP    OTHER SURGICAL HISTORY N/A 10/27/2016    DIAGNOSTIC LAPAROSCOPY WITH LYSIS OF ADHESIONS    SALPINGO-OOPHORECTOMY  2001    [facility] - left    SHOULDER SURGERY Bilateral     x 2    TOE SURGERY  03/10/2011    excision of bone spur left hallux    TOE SURGERY  03/10/2011    Sven Brancheau arthrodesis left SUB JOINT    UPPER GASTROINTESTINAL ENDOSCOPY         Social History:    Social History     Tobacco Use    Smoking status: Never    Smokeless tobacco: Never   Substance Use Topics    Alcohol use: Yes     Comment: very rarely                                Counseling given: Not Answered      Vital Signs (Current): There were no vitals filed for this visit. BP Readings from Last 3 Encounters:   12/20/22 (!) 125/97   01/26/19 (!) 166/101   06/29/18 (!) 154/94       NPO Status:                                                                                 BMI:   Wt Readings from Last 3 Encounters:   12/20/22 198 lb 4.8 oz (89.9 kg)   01/26/19 215 lb 2.7 oz (97.6 kg)   12/27/17 187 lb 12.8 oz (85.2 kg)     There is no height or weight on file to calculate BMI.    CBC:   Lab Results   Component Value Date/Time    WBC 7.0 01/26/2019 12:50 AM    RBC 3.96 01/26/2019 12:50 AM    HGB 11.1 01/26/2019 12:50 AM    HCT 33.8 01/26/2019 12:50 AM    MCV 85.2 01/26/2019 12:50 AM    RDW 12.2 01/26/2019 12:50 AM     01/26/2019 12:50 AM       CMP:   Lab Results   Component Value Date/Time     01/26/2019 12:50 AM    K 3.8 01/26/2019 12:50 AM     01/26/2019 12:50 AM    CO2 26 01/26/2019 12:50 AM    BUN 13 01/26/2019 12:50 AM    CREATININE 0.8 01/26/2019 12:50 AM    GFRAA >60 01/26/2019 12:50 AM    GFRAA 119 11/22/2012 09:05 AM    AGRATIO 1.3 01/26/2019 12:50 AM    LABGLOM >60 01/26/2019 12:50 AM    GLUCOSE 113 01/26/2019 12:50 AM    PROT 7.9 01/26/2019 12:50 AM    PROT 6.8 11/13/2012 05:05 PM    CALCIUM 10.0 01/26/2019 12:50 AM    BILITOT <0.2 01/26/2019 12:50 AM    ALKPHOS 83 01/26/2019 12:50 AM    AST 12 01/26/2019 12:50 AM    ALT 13 01/26/2019 12:50 AM       POC Tests: No results for input(s): POCGLU, POCNA, POCK, POCCL, POCBUN, POCHEMO, POCHCT in the last 72 hours.     Coags:   Lab Results   Component Value Date/Time    PROTIME 11.6 02/17/2017 02:15 PM    INR 1.03 02/17/2017 02:15 PM    APTT 30.8 05/05/2016 01:55 PM       HCG (If Applicable):   Lab Results   Component Value Date    PREGTESTUR Negative 06/29/2018        ABGs:   Lab Results   Component Value Date/Time    PHART 7.48 11/14/2012 08:59 AM    PO2ART 122 11/14/2012 08:59 AM [id number] 33 11/14/2012 08:59 AM    ZMR1DCT 24 11/14/2012 08:59 AM    BEART 1.4 11/14/2012 08:59 AM        Type & Screen (If Applicable):  No results found for: LABABO, LABRH    Drug/Infectious Status (If Applicable):  No results found for: HIV, HEPCAB    COVID-19 Screening (If Applicable): No results found for: COVID19        Anesthesia Evaluation     history of anesthetic complications: PONV. Airway: Mallampati: II  TM distance: >3 FB   Neck ROM: full  Mouth opening: > = 3 FB   Dental:      Comment: Temporary crown over upper incisor. Pulmonary:       (-) COPD, asthma, rhonchi, wheezes, rales and not a current smoker                           Cardiovascular:  Exercise tolerance: no interval change,   (+) hypertension:, angina:, hyperlipidemia    (-) peripheral edema      Rhythm: regular  Rate: normal                    Neuro/Psych:   (+) seizures (reports history of seizures (last 1998, no longer on antiepileptics); pseudoseizures also reported):, CVA:, psychiatric history (Bipolar, PTSD, conversion disorder):             ROS comment: Chronic back pain, lumbar radiculopathy    Peripheral neuropathy GI/Hepatic/Renal:   (+) hiatal hernia, GERD:, bowel prep,      (-) no renal disease and no morbid obesityLiver disease: BU[place]NAN. ROS comment: + history of hematochezia / hematemesis    History of dysphagia s/p esophageal dilation    Borderline delayed gastric emptying in the mid phase only. Otherwise, normal nuclear gastric emptying study     2cm sliding hiatal hernia noted on 2021 EGD. Endo/Other:    (+) Diabetesusing insulin, blood dyscrasia (Hgb last 9.5)::., malignancy/cancer (ovarian). Abdominal:         (-) obese Abdomen: soft. Vascular: Other Findings:           Anesthesia Plan      MAC     ASA 3     (Multiple allergies noted including zofran. Questionable delayed gastric empyting in past. Famotidine and bicitra ordered in preop.   Not on original schedule received from [facility]. NPO appropriate. Ms. Mirna Ovalle denies active nausea / reflux. Insulin held. BS 55 on arrival while NPO. D50 given.)        Anesthetic plan and risks discussed with patient. Plan discussed with CRNA. This pre-anesthesia assessment may be used as a history and physical.    DOS STAFF ADDENDUM:    Pt seen and examined, chart reviewed (including anesthesia, drug and allergy history). No interval changes to history and physical examination. Anesthetic plan, risks, benefits, alternatives, and personnel involved discussed with patient. Patient verbalized an understanding and agrees to proceed.       Carmina Coker MD  March 6, 2023  8:27 AM

## 2023-03-06 NOTE — PROGRESS NOTES
Health history reviewed with Dr. Harman Askew.   Ok to proceed with colonoscopy and egd at outpatient facility

## 2023-03-06 NOTE — PROGRESS NOTES
[facility] ENDOSCOPY COLONOSCOPY PRE-OPERATIVE INSTRUCTIONS    Procedure date__3/6/2023_______  Arrival time___1230_________         Surgery time__1330__________       Clear liquids the day before the procedure. Do not eat or drink anything within 5 hours of your procedure. This includes water chewing gum, mints and ice chips. You may brush your teeth and gargle the morning of your surgery, but do not swallow the water    You may be asked to stop blood thinners such as Coumadin, Plavix, Fragmin, Lovenox, etc., or any anti-inflammatories such as:  Aspirin, Ibuprofen, Advil, Naproxen prior to your procedure. We also ask that you stop any OTC medications such as fish oil, vitamin E, glucosamine, garlic, Multivitamins, COQ 10, etc.    You must make arrangements for a responsible adult to arrive with you and stay in our waiting area during your procedure. They will also need to take you home after your procedure. For your safety you will not be allowed to leave alone or drive yourself home. Also for your safety, it is strongly suggested that someone stay with you the first 24 hours after your procedure. For your comfort, please wear simple loose fitting clothing to the center. Please do not bring valuables. If you have a living will and a durable power of  for healthcare, please bring in a copy.      You will need to bring a photo ID and insurance card    Our goal is to provide you with excellent care so if you have any questions, please contact us at the [facility] at [phone]         Please note these are generalized instructions for all colonoscopy cases, you may be provided with more specific instructions if necessary

## 2023-05-10 ENCOUNTER — APPOINTMENT (OUTPATIENT)
Dept: GENERAL RADIOLOGY | Age: 53
End: 2023-05-10
Payer: COMMERCIAL

## 2023-05-10 ENCOUNTER — HOSPITAL ENCOUNTER (EMERGENCY)
Age: 53
Discharge: HOME | End: 2023-05-10
Attending: EMERGENCY MEDICINE
Payer: COMMERCIAL

## 2023-05-10 VITALS
DIASTOLIC BLOOD PRESSURE: 90 MMHG | RESPIRATION RATE: 18 BRPM | OXYGEN SATURATION: 100 % | SYSTOLIC BLOOD PRESSURE: 163 MMHG | HEART RATE: 77 BPM | TEMPERATURE: 98.5 F

## 2023-05-10 DIAGNOSIS — S46.912A LEFT SHOULDER STRAIN, INITIAL ENCOUNTER: ICD-10-CM
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
## 2023-05-10 PROCEDURE — 99283 EMERGENCY DEPT VISIT LOW MDM: CPT

## 2023-05-10 PROCEDURE — 73060 X-RAY EXAM OF HUMERUS: CPT

## 2023-05-10 PROCEDURE — 71101 X-RAY EXAM UNILAT RIBS/CHEST: CPT

## 2023-05-10 PROCEDURE — 6370000000 HC RX 637 (ALT 250 FOR IP): Performed by: EMERGENCY MEDICINE

## 2023-05-10 RX ORDER — CYCLOBENZAPRINE HCL 10 MG
10 TABLET ORAL ONCE
Status: COMPLETED | OUTPATIENT
Start: 2023-05-10 | End: 2023-05-10

## 2023-05-10 RX ORDER — CYCLOBENZAPRINE HCL 10 MG
10 TABLET ORAL 3 TIMES DAILY PRN
Qty: 20 TABLET | Refills: 0 | Status: SHIPPED | OUTPATIENT
Start: 2023-05-10 | End: 2023-05-20

## 2023-05-10 RX ORDER — ACETAMINOPHEN 500 MG
1000 TABLET ORAL ONCE
Status: COMPLETED | OUTPATIENT
Start: 2023-05-10 | End: 2023-05-10

## 2023-05-10 RX ADMIN — ACETAMINOPHEN 1000 MG: 500 TABLET ORAL at 14:53

## 2023-05-10 RX ADMIN — CYCLOBENZAPRINE 10 MG: 10 TABLET, FILM COATED ORAL at 14:53

## 2023-05-10 ASSESSMENT — PAIN SCALES - GENERAL: PAINLEVEL_OUTOF10: 9
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
## 2023-06-04 ENCOUNTER — HOSPITAL ENCOUNTER (EMERGENCY)
Age: 53
Discharge: HOME | End: 2023-06-04
Attending: EMERGENCY MEDICINE
Payer: COMMERCIAL

## 2023-06-04 VITALS
TEMPERATURE: 98.1 F | DIASTOLIC BLOOD PRESSURE: 97 MMHG | WEIGHT: 191.8 LBS | BODY MASS INDEX: 30.04 KG/M2 | SYSTOLIC BLOOD PRESSURE: 142 MMHG | HEART RATE: 77 BPM | RESPIRATION RATE: 16 BRPM | OXYGEN SATURATION: 100 %

## 2023-06-04 DIAGNOSIS — J02.9 PHARYNGITIS, UNSPECIFIED ETIOLOGY: Primary | ICD-10-CM

## 2023-06-04 LAB
S PYO AG THROAT QL: NEGATIVE
SARS-COV-2 RDRP RESP QL NAA+PROBE: NOT DETECTED

## 2023-06-04 PROCEDURE — 6370000000 HC RX 637 (ALT 250 FOR IP): Performed by: EMERGENCY MEDICINE

## 2023-06-04 PROCEDURE — 87880 STREP A ASSAY W/OPTIC: CPT

## 2023-06-04 PROCEDURE — 87635 SARS-COV-2 COVID-19 AMP PRB: CPT

## 2023-06-04 PROCEDURE — 6360000002 HC RX W HCPCS: Performed by: EMERGENCY MEDICINE

## 2023-06-04 PROCEDURE — 99283 EMERGENCY DEPT VISIT LOW MDM: CPT

## 2023-06-04 PROCEDURE — 87081 CULTURE SCREEN ONLY: CPT

## 2023-06-04 RX ORDER — ACETAMINOPHEN 500 MG
1000 TABLET ORAL ONCE
Status: COMPLETED | OUTPATIENT
Start: 2023-06-04 | End: 2023-06-04

## 2023-06-04 RX ORDER — CETIRIZINE HYDROCHLORIDE 10 MG/1
10 TABLET ORAL DAILY
Qty: 7 TABLET | Refills: 0 | Status: SHIPPED | OUTPATIENT
Start: 2023-06-04 | End: 2023-06-11

## 2023-06-04 RX ORDER — DEXAMETHASONE 4 MG/1
6 TABLET ORAL ONCE
Status: COMPLETED | OUTPATIENT
Start: 2023-06-04 | End: 2023-06-04

## 2023-06-04 RX ADMIN — DEXAMETHASONE 6 MG: 4 TABLET ORAL at 07:39

## 2023-06-04 RX ADMIN — ACETAMINOPHEN 1000 MG: 500 TABLET ORAL at 07:40

## 2023-06-04 ASSESSMENT — LIFESTYLE VARIABLES
HOW MANY STANDARD DRINKS CONTAINING ALCOHOL DO YOU HAVE ON A TYPICAL DAY: 1 OR 2
HOW OFTEN DO YOU HAVE A DRINK CONTAINING ALCOHOL: MONTHLY OR LESS

## 2023-06-04 ASSESSMENT — PAIN SCALES - GENERAL
PAINLEVEL_OUTOF10: 8

## 2023-06-04 ASSESSMENT — PAIN - FUNCTIONAL ASSESSMENT
PAIN_FUNCTIONAL_ASSESSMENT: 0-10
PAIN_FUNCTIONAL_ASSESSMENT: 0-10

## 2023-06-06 LAB — S PYO THROAT QL CULT: NORMAL

## 2023-06-17 ASSESSMENT — ENCOUNTER SYMPTOMS
VOMITING: 0
ABDOMINAL PAIN: 0
COUGH: 0
SHORTNESS OF BREATH: 0
TROUBLE SWALLOWING: 0
NAUSEA: 0
SORE THROAT: 1
VOICE CHANGE: 0

## 2023-10-19 ENCOUNTER — OFFICE VISIT (OUTPATIENT)
Age: 53
End: 2023-10-19

## 2023-10-19 VITALS
HEIGHT: 67 IN | OXYGEN SATURATION: 97 % | TEMPERATURE: 98.1 F | BODY MASS INDEX: 27.53 KG/M2 | HEART RATE: 78 BPM | SYSTOLIC BLOOD PRESSURE: 132 MMHG | WEIGHT: 175.4 LBS | DIASTOLIC BLOOD PRESSURE: 87 MMHG

## 2023-10-19 DIAGNOSIS — M18.12 PRIMARY OSTEOARTHRITIS OF FIRST CARPOMETACARPAL JOINT OF LEFT HAND: Primary | ICD-10-CM

## 2023-10-23 ENCOUNTER — ANESTHESIA (OUTPATIENT)
Dept: OPERATING ROOM | Age: 53
End: 2023-10-23
Payer: COMMERCIAL

## 2023-10-23 ENCOUNTER — HOSPITAL ENCOUNTER (OUTPATIENT)
Age: 53
Setting detail: OUTPATIENT SURGERY
Discharge: HOME | End: 2023-10-23
Attending: PODIATRIST | Admitting: PODIATRIST
Payer: COMMERCIAL

## 2023-10-23 ENCOUNTER — ANESTHESIA EVENT (OUTPATIENT)
Dept: OPERATING ROOM | Age: 53
End: 2023-10-23
Payer: COMMERCIAL

## 2023-10-23 VITALS
DIASTOLIC BLOOD PRESSURE: 97 MMHG | BODY MASS INDEX: 28.72 KG/M2 | OXYGEN SATURATION: 97 % | RESPIRATION RATE: 12 BRPM | WEIGHT: 183 LBS | SYSTOLIC BLOOD PRESSURE: 143 MMHG | HEIGHT: 67 IN | TEMPERATURE: 97.6 F | HEART RATE: 82 BPM

## 2023-10-23 LAB
GLUCOSE BLD-MCNC: 105 MG/DL (ref 70–99)
GLUCOSE BLD-MCNC: 120 MG/DL (ref 70–99)
PERFORMED ON: ABNORMAL
PERFORMED ON: ABNORMAL

## 2023-10-23 PROCEDURE — 2580000003 HC RX 258: Performed by: ANESTHESIOLOGY

## 2023-10-23 PROCEDURE — 7100000010 HC PHASE II RECOVERY - FIRST 15 MIN: Performed by: PODIATRIST

## 2023-10-23 PROCEDURE — C1713 ANCHOR/SCREW BN/BN,TIS/BN: HCPCS | Performed by: PODIATRIST

## 2023-10-23 PROCEDURE — 7100000011 HC PHASE II RECOVERY - ADDTL 15 MIN: Performed by: PODIATRIST

## 2023-10-23 PROCEDURE — 6360000002 HC RX W HCPCS: Performed by: NURSE ANESTHETIST, CERTIFIED REGISTERED

## 2023-10-23 PROCEDURE — 7100000000 HC PACU RECOVERY - FIRST 15 MIN: Performed by: PODIATRIST

## 2023-10-23 PROCEDURE — 3700000000 HC ANESTHESIA ATTENDED CARE: Performed by: PODIATRIST

## 2023-10-23 PROCEDURE — 2500000003 HC RX 250 WO HCPCS: Performed by: PODIATRIST

## 2023-10-23 PROCEDURE — 2580000003 HC RX 258: Performed by: PODIATRIST

## 2023-10-23 PROCEDURE — 6360000002 HC RX W HCPCS: Performed by: PODIATRIST

## 2023-10-23 PROCEDURE — 3600000013 HC SURGERY LEVEL 3 ADDTL 15MIN: Performed by: PODIATRIST

## 2023-10-23 PROCEDURE — 3700000001 HC ADD 15 MINUTES (ANESTHESIA): Performed by: PODIATRIST

## 2023-10-23 PROCEDURE — 6370000000 HC RX 637 (ALT 250 FOR IP): Performed by: ANESTHESIOLOGY

## 2023-10-23 PROCEDURE — 6360000002 HC RX W HCPCS: Performed by: ANESTHESIOLOGY

## 2023-10-23 PROCEDURE — 2500000003 HC RX 250 WO HCPCS: Performed by: NURSE ANESTHETIST, CERTIFIED REGISTERED

## 2023-10-23 PROCEDURE — 7100000001 HC PACU RECOVERY - ADDTL 15 MIN: Performed by: PODIATRIST

## 2023-10-23 PROCEDURE — 3600000003 HC SURGERY LEVEL 3 BASE: Performed by: PODIATRIST

## 2023-10-23 PROCEDURE — 2709999900 HC NON-CHARGEABLE SUPPLY: Performed by: PODIATRIST

## 2023-10-23 RX ORDER — HYDROMORPHONE HYDROCHLORIDE 2 MG/ML
0.25 INJECTION, SOLUTION INTRAMUSCULAR; INTRAVENOUS; SUBCUTANEOUS EVERY 5 MIN PRN
Status: DISCONTINUED | OUTPATIENT
Start: 2023-10-23 | End: 2023-10-23 | Stop reason: HOSPADM

## 2023-10-23 RX ORDER — LIDOCAINE HYDROCHLORIDE 20 MG/ML
INJECTION, SOLUTION INFILTRATION; PERINEURAL PRN
Status: DISCONTINUED | OUTPATIENT
Start: 2023-10-23 | End: 2023-10-23 | Stop reason: SDUPTHER

## 2023-10-23 RX ORDER — ONDANSETRON 2 MG/ML
4 INJECTION INTRAMUSCULAR; INTRAVENOUS
Status: DISCONTINUED | OUTPATIENT
Start: 2023-10-23 | End: 2023-10-23 | Stop reason: HOSPADM

## 2023-10-23 RX ORDER — DEXAMETHASONE SODIUM PHOSPHATE 4 MG/ML
INJECTION, SOLUTION INTRA-ARTICULAR; INTRALESIONAL; INTRAMUSCULAR; INTRAVENOUS; SOFT TISSUE PRN
Status: DISCONTINUED | OUTPATIENT
Start: 2023-10-23 | End: 2023-10-23 | Stop reason: SDUPTHER

## 2023-10-23 RX ORDER — LIDOCAINE HYDROCHLORIDE 10 MG/ML
0.5 INJECTION, SOLUTION EPIDURAL; INFILTRATION; INTRACAUDAL; PERINEURAL ONCE
Status: DISCONTINUED | OUTPATIENT
Start: 2023-10-23 | End: 2023-10-23 | Stop reason: HOSPADM

## 2023-10-23 RX ORDER — LIDOCAINE HYDROCHLORIDE 20 MG/ML
INJECTION, SOLUTION INFILTRATION; PERINEURAL
Status: COMPLETED | OUTPATIENT
Start: 2023-10-23 | End: 2023-10-23

## 2023-10-23 RX ORDER — OXYCODONE HYDROCHLORIDE AND ACETAMINOPHEN 5; 325 MG/1; MG/1
1 TABLET ORAL ONCE
Status: COMPLETED | OUTPATIENT
Start: 2023-10-23 | End: 2023-10-23

## 2023-10-23 RX ORDER — SODIUM CHLORIDE 9 MG/ML
INJECTION, SOLUTION INTRAVENOUS PRN
Status: DISCONTINUED | OUTPATIENT
Start: 2023-10-23 | End: 2023-10-23 | Stop reason: HOSPADM

## 2023-10-23 RX ORDER — SODIUM CHLORIDE 0.9 % (FLUSH) 0.9 %
5-40 SYRINGE (ML) INJECTION PRN
Status: DISCONTINUED | OUTPATIENT
Start: 2023-10-23 | End: 2023-10-23 | Stop reason: HOSPADM

## 2023-10-23 RX ORDER — MIDAZOLAM HYDROCHLORIDE 1 MG/ML
INJECTION INTRAMUSCULAR; INTRAVENOUS PRN
Status: DISCONTINUED | OUTPATIENT
Start: 2023-10-23 | End: 2023-10-23 | Stop reason: SDUPTHER

## 2023-10-23 RX ORDER — FENTANYL CITRATE 50 UG/ML
INJECTION, SOLUTION INTRAMUSCULAR; INTRAVENOUS PRN
Status: DISCONTINUED | OUTPATIENT
Start: 2023-10-23 | End: 2023-10-23 | Stop reason: SDUPTHER

## 2023-10-23 RX ORDER — HYDROMORPHONE HYDROCHLORIDE 2 MG/ML
0.5 INJECTION, SOLUTION INTRAMUSCULAR; INTRAVENOUS; SUBCUTANEOUS EVERY 5 MIN PRN
Status: DISCONTINUED | OUTPATIENT
Start: 2023-10-23 | End: 2023-10-23 | Stop reason: HOSPADM

## 2023-10-23 RX ORDER — SODIUM CHLORIDE 9 MG/ML
INJECTION, SOLUTION INTRAVENOUS CONTINUOUS
Status: DISCONTINUED | OUTPATIENT
Start: 2023-10-23 | End: 2023-10-23 | Stop reason: HOSPADM

## 2023-10-23 RX ORDER — PROPOFOL 10 MG/ML
INJECTION, EMULSION INTRAVENOUS PRN
Status: DISCONTINUED | OUTPATIENT
Start: 2023-10-23 | End: 2023-10-23 | Stop reason: SDUPTHER

## 2023-10-23 RX ORDER — SODIUM CHLORIDE 0.9 % (FLUSH) 0.9 %
5-40 SYRINGE (ML) INJECTION EVERY 12 HOURS SCHEDULED
Status: DISCONTINUED | OUTPATIENT
Start: 2023-10-23 | End: 2023-10-23 | Stop reason: HOSPADM

## 2023-10-23 RX ADMIN — MIDAZOLAM 2 MG: 1 INJECTION INTRAMUSCULAR; INTRAVENOUS at 08:42

## 2023-10-23 RX ADMIN — FENTANYL CITRATE 50 MCG: 50 INJECTION, SOLUTION INTRAMUSCULAR; INTRAVENOUS at 08:49

## 2023-10-23 RX ADMIN — HYDROMORPHONE HYDROCHLORIDE 0.5 MG: 2 INJECTION, SOLUTION INTRAMUSCULAR; INTRAVENOUS; SUBCUTANEOUS at 10:26

## 2023-10-23 RX ADMIN — LIDOCAINE HYDROCHLORIDE 100 MG: 20 INJECTION, SOLUTION INFILTRATION; PERINEURAL at 08:49

## 2023-10-23 RX ADMIN — CEFAZOLIN 2000 MG: 2 INJECTION, POWDER, FOR SOLUTION INTRAMUSCULAR; INTRAVENOUS at 08:39

## 2023-10-23 RX ADMIN — PROPOFOL 200 MG: 10 INJECTION, EMULSION INTRAVENOUS at 08:49

## 2023-10-23 RX ADMIN — FENTANYL CITRATE 25 MCG: 50 INJECTION, SOLUTION INTRAMUSCULAR; INTRAVENOUS at 09:39

## 2023-10-23 RX ADMIN — FENTANYL CITRATE 25 MCG: 50 INJECTION, SOLUTION INTRAMUSCULAR; INTRAVENOUS at 09:32

## 2023-10-23 RX ADMIN — HYDROMORPHONE HYDROCHLORIDE 0.5 MG: 2 INJECTION, SOLUTION INTRAMUSCULAR; INTRAVENOUS; SUBCUTANEOUS at 10:38

## 2023-10-23 RX ADMIN — PROMETHAZINE HYDROCHLORIDE 6.25 MG: 25 INJECTION INTRAMUSCULAR; INTRAVENOUS at 10:48

## 2023-10-23 RX ADMIN — OXYCODONE AND ACETAMINOPHEN 1 TABLET: 5; 325 TABLET ORAL at 11:24

## 2023-10-23 RX ADMIN — DEXAMETHASONE SODIUM PHOSPHATE 4 MG: 4 INJECTION, SOLUTION INTRAMUSCULAR; INTRAVENOUS at 08:55

## 2023-10-23 RX ADMIN — SODIUM CHLORIDE: 9 INJECTION, SOLUTION INTRAVENOUS at 08:10

## 2023-10-23 RX ADMIN — HYDROMORPHONE HYDROCHLORIDE 0.5 MG: 2 INJECTION, SOLUTION INTRAMUSCULAR; INTRAVENOUS; SUBCUTANEOUS at 11:09

## 2023-10-23 ASSESSMENT — PAIN SCALES - GENERAL
PAINLEVEL_OUTOF10: 8
PAINLEVEL_OUTOF10: 1
PAINLEVEL_OUTOF10: 8
PAINLEVEL_OUTOF10: 10
PAINLEVEL_OUTOF10: 4
PAINLEVEL_OUTOF10: 8
PAINLEVEL_OUTOF10: 8

## 2023-10-23 ASSESSMENT — PAIN DESCRIPTION - ORIENTATION
ORIENTATION: RIGHT

## 2023-10-23 ASSESSMENT — PAIN DESCRIPTION - LOCATION
LOCATION: ANKLE

## 2023-10-23 ASSESSMENT — PAIN DESCRIPTION - DESCRIPTORS
DESCRIPTORS: SHARP;PRESSURE
DESCRIPTORS: SHARP;PRESSURE
DESCRIPTORS: PRESSURE;SHARP
DESCRIPTORS: SHARP;PRESSURE
DESCRIPTORS: SHARP;PRESSURE
DESCRIPTORS: PRESSURE;SHARP

## 2023-10-23 ASSESSMENT — PAIN DESCRIPTION - PAIN TYPE: TYPE: SURGICAL PAIN

## 2023-10-23 ASSESSMENT — PAIN - FUNCTIONAL ASSESSMENT: PAIN_FUNCTIONAL_ASSESSMENT: 0-10

## 2023-10-23 NOTE — PROGRESS NOTES
Pt arrived from OR to PACU bay 1. Reported received from [facility], RN/ CRNA staff. Pt is not arousable to voice. Surgical incisions dressings in place to right ankle. Pt on a Simple Mask @5-L with oral airway in place. NSR, and VSS. Will continue to monitor.

## 2023-10-23 NOTE — ANESTHESIA PRE PROCEDURE
Component Value Date/Time     01/26/2019 12:50 AM    K 3.8 01/26/2019 12:50 AM     01/26/2019 12:50 AM    CO2 26 01/26/2019 12:50 AM    BUN 13 01/26/2019 12:50 AM    CREATININE 0.8 01/26/2019 12:50 AM    GFRAA >60 01/26/2019 12:50 AM    GFRAA 119 11/22/2012 09:05 AM    AGRATIO 1.3 01/26/2019 12:50 AM    LABGLOM >60 01/26/2019 12:50 AM    GLUCOSE 113 01/26/2019 12:50 AM    PROT 7.9 01/26/2019 12:50 AM    PROT 6.8 11/13/2012 05:05 PM    CALCIUM 10.0 01/26/2019 12:50 AM    BILITOT <0.2 01/26/2019 12:50 AM    ALKPHOS 83 01/26/2019 12:50 AM    AST 12 01/26/2019 12:50 AM    ALT 13 01/26/2019 12:50 AM       POC Tests: No results for input(s): \"POCGLU\", \"POCNA\", \"POCK\", \"POCCL\", \"POCBUN\", \"POCHEMO\", \"POCHCT\" in the last 72 hours.     Coags:   Lab Results   Component Value Date/Time    PROTIME 11.6 02/17/2017 02:15 PM    INR 1.03 02/17/2017 02:15 PM    APTT 30.8 05/05/2016 01:55 PM       HCG (If Applicable):   Lab Results   Component Value Date    PREGTESTUR Negative 06/29/2018        ABGs:   Lab Results   Component Value Date/Time    PHART 7.48 11/14/2012 08:59 AM    PO2ART 122 11/14/2012 08:59 AM    LIZ7PRP 33 11/14/2012 08:59 AM    IXV9DVY 24 11/14/2012 08:59 AM    BEART 1.4 11/14/2012 08:59 AM        Type & Screen (If Applicable):  No results found for: \"LABABO\", \"LABRH\"    Drug/Infectious Status (If Applicable):  No results found for: \"HIV\", \"HEPCAB\"    COVID-19 Screening (If Applicable):   Lab Results   Component Value Date/Time    COVID19 Not Detected 06/04/2023 06:42 AM           Anesthesia Evaluation  Patient summary reviewed and Nursing notes reviewed  Airway: Mallampati: II  TM distance: >3 FB   Neck ROM: full  Mouth opening: > = 3 FB   Dental: normal exam         Pulmonary:normal exam  breath sounds clear to auscultation                             Cardiovascular:    (+) hypertension:, angina:, past MI:,       ECG reviewed  Rhythm: regular  Rate: normal  Echocardiogram reviewed               ROS

## 2023-10-23 NOTE — DISCHARGE INSTRUCTIONS
DR. Abraham Jules POST OPERATIVE INSTRUCTIONS    1-Have prescription filled and take as directed. All medication should be taken with food or milk. 2-Keep foot elevated six inches above level of heart. Support feet, legs and knees with pillows. KEEP FOOT ELEVATED AS MUCH AS POSSIBLE UNTIL YOUR NEXT VISIT. 3-Place an icepack on the ankle 15 minutes every hour while awake. 4-Keep dressing clean, dry and intact. DO NOT REMOVE DRESSING. CALL OFFICE IF BANDAGE COMES OFF.    5-STAY OFF FEET EXCEPT TO GO TO THE BATHROOM    6-For the first seven days after surgery, take temperature by mouth three times a day. Call the office if greater than 101 degrees F.     7-Ambulate NWB on right with surgical shoe and crutches. 8-All instructions are to be followed until otherwise instructed by your surgeon. 9-Call our office if you have any concerns or questions. Our phones are answered 24 hours a day. 10-Your first post-op appointment is  tomorrow in the office. GENERAL SURGERY DISCHARGE INSTRUCTIONS    Follow your surgeons instructions. Follow up with your surgeon as directed. Observe the operative area for signs of excessive bleeding. If needed apply pressure,elevate if able and contact your surgeon. Observe the operative site for any signs of infection- such as increased pain,redness,fever greater than 101 degrees,swelling, foul odor or drainage. Contact your surgeon if any of these symptoms are present. Keep operative site clean and dry. Do not remove dressing unless instructed to by surgeon. Apply ice as directed. Avoid pulling,pushing or tugging to suture line. If you become short of breath call your doctor or go to the ER. Take medications as directed. Pain medication should be taken with food. Do not drive or operate machinery while taking narcotics. For any problems or question call your surgeon. ANESTHESIA DISCHARGE INSTRUCTIONS    Wear your seatbelt home.   You are under the influence of

## 2023-10-23 NOTE — ANESTHESIA POSTPROCEDURE EVALUATION
Department of Anesthesiology  Postprocedure Note    Patient: Nicki Heredia  MRN: [id number]  YOB: 1970  Date of evaluation: 10/23/2023      Procedure Summary     Date: 10/23/23 Room / Location: [address]    Anesthesia Start: 8023 Anesthesia Stop: 8610    Procedure: REMOVE ANDRZEJ SUBTALAR JOINT IMPLANT, RIGHT (Right: Foot) Diagnosis:       Traumatic arthropathy of ankle, right      (Traumatic arthropathy of ankle, right [M12.571])    Surgeons: Remigio Jackson DPM Responsible Provider: Ciarra Zazueta MD    Anesthesia Type: MAC, general ASA Status: 3          Anesthesia Type: No value filed.     Martha Phase I: Martha Score: 10    Martha Phase II:        Anesthesia Post Evaluation    Patient location during evaluation: PACU  Patient participation: complete - patient participated  Level of consciousness: awake  Airway patency: patent  Nausea & Vomiting: no nausea and no vomiting  Complications: no  Cardiovascular status: blood pressure returned to baseline  Respiratory status: acceptable  Hydration status: stable  Pain management: satisfactory to patient

## 2023-10-23 NOTE — PROGRESS NOTES
Pt awake and alert at this time. Pt on RA, and VSS. Pt denies nausea is having some surgical pain , tolerating PO. Skin warm , palpable pulses and able to wiggle toes. Pt meets criteria to be discharged from Phase 1.

## 2023-10-23 NOTE — H&P
Date of Surgery Update:  Peggy Anthony was seen, history and physical examination reviewed, and patient examined by me today. There have been no significant clinical changes since the completion of the previous history and physical.    The risk, benefits, and alternatives of the proposed procedure have been explained to the patient (or appropriate guardian) and understanding verbalized. All questions answered. Patient wishes to proceed.     Electronically signed by: Jeanine Alva DPM,10/23/2023,8:31 AM

## 2023-10-23 NOTE — BRIEF OP NOTE
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  Item Serial No.  Lot No. LRB No. Used Action   COMPONENT SUBTALAR 8MM TI BRL SHP SLT DSGN ANDRZEJ - [id number]  COMPONENT SUBTALAR 8MM TI BRL SHP SLT DSGN ANDRZEJ  [employer] [id number] Right 1 Explanted         Drains: * No LDAs found *    Findings: painful implant      Electronically signed by Margaret Florence DPM on 10/23/2023 at 9:37 AM

## 2023-10-24 NOTE — PROCEDURES
[facility]                     [address], [address]                                 PROCEDURE NOTE    PATIENT NAME: ILAN WEST                       :        1970  MED REC NO:   [id number]                          ROOM:  ACCOUNT NO:   [de-identified]                           ADMIT DATE: 10/23/2023  PROVIDER:     Gerardo Lu DPM    DATE OF PROCEDURE:  10/23/2023    PREOPERATIVE DIAGNOSIS:  Painful implant that is right next to traumatic  arthritis, right. POSTOPERATIVE DIAGNOSIS:  Painful implant that is right next to  traumatic arthritis, right. OPERATION PERFORMED:  1. Removal of ANDRZEJ implant on the right. 2.  Excision of bone spur and arthritis, subtalar joint on the right. SURGEON:  Gerardo Lu DPM    ASSISTANT:  None. PATHOLOGY:  None. ANESTHESIA:  General.    HEMOSTASIS:  Right calf pneumatic tourniquet, elevated to 250 mmHg. ESTIMATED BLOOD LOSS:  Minimal.    MATERIAL USED:  4-0 Vicryl and 5-0 nylon. INJECTABLE:  15 mL of 2% lidocaine plain. COMPLICATIONS:  None. HISTORY:  This is a 59-year-old female who had originally presented to  my office with pain within the subtalar joint of her right foot. She  previously had an ANDRZEJ implant placed. It appeared on x-ray that the  implant has shifted laterally at this time and has become painful. Some  arthritis and bone spurs were also seen within the subtalar joint as  well. Both conservative versus surgical options were explained at  length to the patient. She accepts these risks and is prepared for  surgery today. Preoperative x-ray did reveal the lateral implant as  well as the arthritis and the bony spurring. OPERATIVE PROCEDURE:  The patient was brought to the operating room at  the [facility] and placed in the supine position.    Following general anesthetic by the Department of Anesthesia, a local  block was administered laterally at the level

## 2025-03-19 RX ORDER — PRAVASTATIN SODIUM 80 MG/1
80 TABLET ORAL
COMMUNITY

## 2025-03-19 NOTE — PROGRESS NOTES
Radha Kaufman    Age 55 y.o.    female    1970    MRN [id number]    3/20/2025  Arrival Time_____________  OR Time____________30 Min     Procedure(s):  COLONOSCOPY                      Monitor Anesthesia Care    Surgeon(s):  Arnie Nielsen, DO       Phone [phone] (home)     Initals  Date  Info Source  Home  Cell         Work  _____________________________________________________________________  _____________________________________________________________________  _____________________________________________________________________  _____________________________________________________________________  _____________________________________________________________________    PCP _____________________________ Phone_________________     H&P  ________________  Bringing      Chart              Epic      DOS      Called________  EKG ________________   Bringing      Chart              Epic      DOS      Called________  LABS________________   Bringing     Chart              Epic      DOS      Called________  Cardiac Clearance ______ Bringing      Chart              Epic      DOS      Called________  Pulmonary Clearance____ Bringing      Chart              Epic      DOS      Called________    Cardiologist________________________ Phone___________________________  Pulmonologist_______________________Phone___________________________    ? Advance Directives   ? Zoroastrianism concerns / Waiver on Chart            PAT Communications________________  ? Pre-op Instructions Given /Understood          _________________________________  ? Directions to Surgery Center                          _________________________________  ? Transportation Home_______________      __________________________________  ? Crutches/Walker__________________        __________________________________    Orders: Hard copy/ EPIC                 Transcribed/ EPIC              _______Wt.    ________Pharmacy                         _______SCD

## 2025-03-19 NOTE — PROGRESS NOTES
Date and time of surgery :  3/20/25 at 0900            Arrival Time:  0800     Bring Picture ID and insurance card.  Please wear simple, loose fitting clothing to the hospital.   Do not bring valuables (money, credit cards, checkbooks, etc.)   Do not wear any makeup (including  eye makeup) and no nail polish or artificial nails on your fingers or toes.  DO NOT wear any jewelry or piercings on day of surgery.  All body piercing jewelry must be removed.  If you have dentures, they will be removed before going to the OR; we will provide you a container.  If you wear contact lenses or glasses, they will be removed; please bring a case for them.  Shower the evening before or morning of surgery with antibacterial soap.  Nothing to eat or drink after 400 am the morning of your procedure.  Hold Candesartan for 24 hours prior to procedure.  Last dose Trulicity 3/8/25  You may brush your teeth and gargle the morning of surgery.  DO NOT SWALLOW WATER.   The morning of your procedure take only Protonix and Amlodipine with a sip of water.  Aspirin, Ibuprofen, Advil, Naproxen, Vitamin E and other Anti-inflammatory products and supplements should be stopped for 5 -7days before surgery or as directed by your physician.  Do not smoke or drink any alcoholic beverages 24 hours prior to surgery.  This includes NA Beer. Refrain from the usage of any recreational drugs, including non-prescribed prescription drugs.   You MUST plan for a responsible adult to stay on site while you are here and take you home after your surgery. You will not be allowed to leave alone or drive yourself home. It is strongly suggested someone stay with you the first 24 hrs. Your surgery will be cancelled if you do not have a ride home.  To help prevent infection, change your sheets the night before surgery.   If you  have a Living Will and Durable Power of  for Healthcare, please bring in a copy.  Notify your Surgeon if you develop any illness between  now and time of surgery. Cough, cold, fever, sore throat, nausea, vomiting, etc.  Please notify your surgeon if you experience dizziness, shortness of breath or blurred vision between now & the time of your surgery  To provide excellent care visitors will be limited to two per room at any given time. No visitors under the age of 14.  If you use oxygen and have a portable tank please bring it with you the DOS  For your convenience [facility] has a pharmacy on site to fill your prescriptions.     *Please call pre admission testing if you have any further questions             Tomasz      [phone]                            Address: [address] [place] [address]     When you pull into the hospital and are looking at the main hospital entrance, turn right.   We are a tan building to the right of the main entrance.   [facility] over the door.  .                                                        Revision History

## 2025-03-20 ENCOUNTER — ANESTHESIA (OUTPATIENT)
Dept: ENDOSCOPY | Age: 55
End: 2025-03-20
Payer: COMMERCIAL

## 2025-03-20 ENCOUNTER — HOSPITAL ENCOUNTER (OUTPATIENT)
Age: 55
Setting detail: OUTPATIENT SURGERY
Discharge: HOME | End: 2025-03-20
Attending: INTERNAL MEDICINE | Admitting: INTERNAL MEDICINE
Payer: COMMERCIAL

## 2025-03-20 ENCOUNTER — ANESTHESIA EVENT (OUTPATIENT)
Dept: ENDOSCOPY | Age: 55
End: 2025-03-20
Payer: COMMERCIAL

## 2025-03-20 VITALS
TEMPERATURE: 98.1 F | HEIGHT: 67 IN | BODY MASS INDEX: 27.47 KG/M2 | RESPIRATION RATE: 15 BRPM | DIASTOLIC BLOOD PRESSURE: 87 MMHG | WEIGHT: 175 LBS | HEART RATE: 83 BPM | SYSTOLIC BLOOD PRESSURE: 134 MMHG | OXYGEN SATURATION: 100 %

## 2025-03-20 LAB
GLUCOSE BLD-MCNC: 89 MG/DL (ref 70–99)
GLUCOSE BLD-MCNC: 92 MG/DL (ref 70–99)
PERFORMED ON: NORMAL
PERFORMED ON: NORMAL

## 2025-03-20 PROCEDURE — 3609027000 HC COLONOSCOPY: Performed by: INTERNAL MEDICINE

## 2025-03-20 PROCEDURE — 7100000011 HC PHASE II RECOVERY - ADDTL 15 MIN: Performed by: INTERNAL MEDICINE

## 2025-03-20 PROCEDURE — 2580000003 HC RX 258: Performed by: ANESTHESIOLOGY

## 2025-03-20 PROCEDURE — 3700000001 HC ADD 15 MINUTES (ANESTHESIA): Performed by: INTERNAL MEDICINE

## 2025-03-20 PROCEDURE — 3700000000 HC ANESTHESIA ATTENDED CARE: Performed by: INTERNAL MEDICINE

## 2025-03-20 PROCEDURE — 2709999900 HC NON-CHARGEABLE SUPPLY: Performed by: INTERNAL MEDICINE

## 2025-03-20 PROCEDURE — 7100000010 HC PHASE II RECOVERY - FIRST 15 MIN: Performed by: INTERNAL MEDICINE

## 2025-03-20 PROCEDURE — 6360000002 HC RX W HCPCS: Performed by: NURSE ANESTHETIST, CERTIFIED REGISTERED

## 2025-03-20 RX ORDER — PROPOFOL 10 MG/ML
INJECTION, EMULSION INTRAVENOUS
Status: DISCONTINUED | OUTPATIENT
Start: 2025-03-20 | End: 2025-03-20 | Stop reason: SDUPTHER

## 2025-03-20 RX ORDER — PROCHLORPERAZINE EDISYLATE 5 MG/ML
5 INJECTION INTRAMUSCULAR; INTRAVENOUS
Status: DISCONTINUED | OUTPATIENT
Start: 2025-03-20 | End: 2025-03-20 | Stop reason: HOSPADM

## 2025-03-20 RX ORDER — OXYCODONE HYDROCHLORIDE 5 MG/1
5 TABLET ORAL
Status: DISCONTINUED | OUTPATIENT
Start: 2025-03-20 | End: 2025-03-20 | Stop reason: HOSPADM

## 2025-03-20 RX ORDER — ONDANSETRON 2 MG/ML
4 INJECTION INTRAMUSCULAR; INTRAVENOUS
Status: DISCONTINUED | OUTPATIENT
Start: 2025-03-20 | End: 2025-03-20 | Stop reason: HOSPADM

## 2025-03-20 RX ORDER — SODIUM CHLORIDE 0.9 % (FLUSH) 0.9 %
5-40 SYRINGE (ML) INJECTION EVERY 12 HOURS SCHEDULED
Status: DISCONTINUED | OUTPATIENT
Start: 2025-03-20 | End: 2025-03-20 | Stop reason: HOSPADM

## 2025-03-20 RX ORDER — LABETALOL HYDROCHLORIDE 5 MG/ML
10 INJECTION, SOLUTION INTRAVENOUS
Status: DISCONTINUED | OUTPATIENT
Start: 2025-03-20 | End: 2025-03-20 | Stop reason: HOSPADM

## 2025-03-20 RX ORDER — MEPERIDINE HYDROCHLORIDE 50 MG/ML
12.5 INJECTION INTRAMUSCULAR; INTRAVENOUS; SUBCUTANEOUS EVERY 5 MIN PRN
Status: DISCONTINUED | OUTPATIENT
Start: 2025-03-20 | End: 2025-03-20 | Stop reason: HOSPADM

## 2025-03-20 RX ORDER — NALOXONE HYDROCHLORIDE 0.4 MG/ML
INJECTION, SOLUTION INTRAMUSCULAR; INTRAVENOUS; SUBCUTANEOUS PRN
Status: DISCONTINUED | OUTPATIENT
Start: 2025-03-20 | End: 2025-03-20 | Stop reason: HOSPADM

## 2025-03-20 RX ORDER — LIDOCAINE HYDROCHLORIDE 20 MG/ML
INJECTION, SOLUTION INFILTRATION; PERINEURAL
Status: DISCONTINUED | OUTPATIENT
Start: 2025-03-20 | End: 2025-03-20 | Stop reason: SDUPTHER

## 2025-03-20 RX ORDER — SODIUM CHLORIDE 0.9 % (FLUSH) 0.9 %
5-40 SYRINGE (ML) INJECTION PRN
Status: DISCONTINUED | OUTPATIENT
Start: 2025-03-20 | End: 2025-03-20 | Stop reason: HOSPADM

## 2025-03-20 RX ORDER — DIPHENHYDRAMINE HYDROCHLORIDE 50 MG/ML
12.5 INJECTION, SOLUTION INTRAMUSCULAR; INTRAVENOUS
Status: DISCONTINUED | OUTPATIENT
Start: 2025-03-20 | End: 2025-03-20 | Stop reason: HOSPADM

## 2025-03-20 RX ORDER — SODIUM CHLORIDE 9 MG/ML
INJECTION, SOLUTION INTRAVENOUS PRN
Status: DISCONTINUED | OUTPATIENT
Start: 2025-03-20 | End: 2025-03-20 | Stop reason: HOSPADM

## 2025-03-20 RX ORDER — LORAZEPAM 2 MG/ML
0.5 INJECTION INTRAMUSCULAR
Status: DISCONTINUED | OUTPATIENT
Start: 2025-03-20 | End: 2025-03-20 | Stop reason: HOSPADM

## 2025-03-20 RX ADMIN — PROPOFOL 50 MG: 10 INJECTION, EMULSION INTRAVENOUS at 08:59

## 2025-03-20 RX ADMIN — SODIUM CHLORIDE: 9 INJECTION, SOLUTION INTRAVENOUS at 08:56

## 2025-03-20 RX ADMIN — PROPOFOL 150 MCG/KG/MIN: 10 INJECTION, EMULSION INTRAVENOUS at 08:59

## 2025-03-20 RX ADMIN — LIDOCAINE HYDROCHLORIDE 100 MG: 20 INJECTION, SOLUTION INFILTRATION; PERINEURAL at 08:59

## 2025-03-20 ASSESSMENT — PAIN - FUNCTIONAL ASSESSMENT
PAIN_FUNCTIONAL_ASSESSMENT: NONE - DENIES PAIN
PAIN_FUNCTIONAL_ASSESSMENT: 0-10

## 2025-03-20 NOTE — ANESTHESIA POSTPROCEDURE EVALUATION
Department of Anesthesiology  Postprocedure Note    Patient: Radha Kaufman  MRN: [id number]  YOB: 1970  Date of evaluation: 3/20/2025    Procedure Summary       Date: 03/20/25 Room / Location: [place] [address] / [facility]    Anesthesia Start: 0856 Anesthesia Stop: 0920    Procedure: COLONOSCOPY Diagnosis:       Iron deficiency anemia, unspecified iron deficiency anemia type      (Iron deficiency anemia, unspecified iron deficiency anemia type [D50.9])    Surgeons: Arnie Nielsen DO Responsible Provider: Santosh Hurtado MD    Anesthesia Type: MAC ASA Status: 3            Anesthesia Type: No value filed.    Martha Phase I: Martha Score: 10    Martha Phase II: Martha Score: 10    Anesthesia Post Evaluation    Patient location during evaluation: PACU  Patient participation: complete - patient participated  Level of consciousness: awake and alert  Airway patency: patent  Nausea & Vomiting: no vomiting and no nausea  Cardiovascular status: hemodynamically stable and blood pressure returned to baseline  Respiratory status: acceptable  Hydration status: euvolemic  Comments: --------------------            03/20/25               0938     --------------------   BP:       134/87     Pulse:      83       Resp:       15       Temp:                SpO2:      100%     --------------------    Pain management: adequate    No notable events documented.

## 2025-03-20 NOTE — H&P
reports chronic abdominal pain that is worse on her left side, greater in left upper quadrant that is not impacted by eating or defecation.  She does have 6-7 loose stools a day, occasional nocturnal awakening to defecate.  She has also noticed bright red blood in stool with defecation as well as when wiping.  Denies any rectal pain.  No abdominal bloat.  Distant history of gastroparesis but recent gastric emptying study negative.  Stool studies as well as C. difficile negative.  She has lost about 40 pounds over the past year.  CT of abdomen and pelvis with contrast benign.  Status post total hysterectomy given history of ovarian cancer.  She does not take NSAIDs, use illicit drugs, smoke tobacco products.  Alcohol intake is rare.  Physical exam today does reveal left-sided abdominal discomfort greater in left upper quadrant.  Recommend starting high-dose daily PPI.  Recommend stat EGD for further evaluation given hematemesis with nausea as well as potential esophageal dilation given recurrent dysphagia, new MAGEN.  Once dysphagia improves, recommend colonoscopy for further evaluation of rectal bleeding as well as iron deficien??? ?iePHd       Past Medical History:   Diagnosis Date    Bipolar affective disorder (HCC)     Chronic prescription opiate use     Hydrocodone 15 mg/day    Class 1 obesity     Conversion disorder 05/31/2016    Diabetes mellitus     DVT (deep venous thrombosis) (HCC) 2000    left leg    Gastroesophageal reflux disease without esophagitis 02/27/2016    Hepatic steatosis     Hyperlipidemia 02/27/2016    Hypertension     Ischemic stroke (HCC) 2020    TIA    NSTEMI (non-ST elevation myocardial infarction) (HCC)     Ovarian cancer (HCC) 1990    PTSD (post-traumatic stress disorder)     Sedative dependence (HCC)     Gabapentin 2,400 mg/day    Seizure disorder (HCC)     last one in 2020     Past Surgical History:   Procedure Laterality Date    ABDOMINAL ADHESION SURGERY  02/26/2014    ANKLE FUSION  kg (175 lb)   SpO2 96%   BMI 27.41 kg/m²     HEENT: NCAT  Lungs: CTAB  CV: RRR  Abd: soft, ntd  Ext: dpi    Lab and Imaging Review   Labs:  CBC: No results for input(s): \"WBC\", \"HGB\", \"HCT\", \"MCV\", \"PLT\" in the last 72 hours.  BMP: No results for input(s): \"NA\", \"K\", \"CL\", \"CO2\", \"PHOS\", \"BUN\", \"CREATININE\" in the last 72 hours.    Invalid input(s): \"CA\"  LIVER PROFILE: No results for input(s): \"AST\", \"ALT\", \"LIPASE\", \"AMYLASE\", \"BILIDIR\", \"BILITOT\", \"ALKPHOS\" in the last 72 hours.    Invalid input(s): \"PROT\", \"ALB\"  PT/INR: No results for input(s): \"INR\" in the last 72 hours.    Invalid input(s): \"PT\"    Pre-Procedure Assessment / Plan:  ASA: Class 2 - A normal healthy patient with mild systemic disease  Airway: Mallampati: II (soft palate, uvula, fauces visible)  Level of Sedation Plan:Deep sedation  Post Procedure plan: Return to same level of care      Plan:   colonoscopy    I assessed the patient and find that the patient is in satisfactory condition to proceed with the planned procedure and sedation plan.    I have explained the risk, benefits, and alternatives to the procedure; the patient understands and agrees to proceed.       Arnie Nielsen,   3/20/2025

## 2025-03-20 NOTE — DISCHARGE INSTRUCTIONS
ENDOSCOPY:  Inspection of any cavity of the body by means of an endoscopy. An endoscope is a flexible tube that allows direct visualization of the cavity.    You have had a Colonoscopy    Discharge Instructions    1)  You may experience some lightheadedness for the next several hours. We suggest you plan on quiet relaxation for the rest of the day.    2)  Because of the sedative drugs in your blood steam, be advised that you should not drive, operate any machinery, or sign contracts for the remainder of the day.    3)  You should not take any alcoholic beverages tonight, and only take sleeping medication that has been specifically prescribed for you by your physician.    4)  Unless instructed differently, resume your regular diet. Eat smaller portions for your first meal and progress to normal amounts over the rest of the day.    5)  If you have any fever, chills, excessive bleeding, uncontrolled pain, increased abdominal bloating, or any other problems, notify your doctor or return to the hospital.    6)  Do not expect a normal bowel movement for one to three days due to the cleansing of the large intestine prior to the colonoscopy.    7) If you have a polyp removed, do not do any strenuous activity and avoid the use of Aspirin or related compounds for 2 week.    8) Call for biopsy results in one week:  [phone]    9)  Follow high fiber diet instruction paper

## 2025-03-20 NOTE — OP NOTE
Colonoscopy Note    Patient:   Radha Kaufman    YOB: 1970    Facility:     [facility]  [address]  [facility] [address]   [Outpatient]  Referring/PCP: Ghanshyam Barros MD  Procedure:   Colonoscopy --diagnostic  Date:     3/20/2025  Endoscopist:  Arnie Nielsen DO, DO    Preoperative Diagnosis:  iron deficiency anemia.    Postoperative Diagnosis:  same    Anesthesia: MAC    Estimated blood loss: < 5 ml    Complications:  None    Description of Procedure:  Informed consent was obtained from the patient after explanation of the procedure including indications, description of the procedure,  benefits and possible risks and complications of the procedure, and alternatives. Questions were answered.  The patient's history was reviewed and a directed physical examination was performed prior to the procedure.    Patient was monitored throughout the procedure with pulse oximetry and periodic assessment of vital signs. Patient was sedated as noted above. The Nursing staff and I performed a time out.  With the patient initially in the left lateral decubitus position, a digital rectal examination was performed and revealed negative without mass, lesions or tenderness.  The Olympus video colonoscope was placed in the patient's rectum and advanced without difficulty  to the cecum, which was identified by the ileocecal valve and appendiceal orifice. Photographs were taken.   The prep was good.  Examination of the mucosa was performed during both introduction and withdrawal of the colonoscope. Retroflexed view of the rectum was performed. Withdrawal time was 6 minutes.      Findings:     1. Normal colon mucosa throughout and terminal ileum     Recommendations:    Repeat colonoscopy in 10 years    DO Tomasz Solomon Office   [address] [facility]    [address]      [place] [address]     Phone: [phone]     Fax: [phone]

## 2025-03-20 NOTE — ANESTHESIA PRE PROCEDURE
Department of Anesthesiology  Preprocedure Note       Name:  Radha Kaufman   Age:  55 y.o.  :  1970                                          MRN:  [id number]         Date:  3/20/2025      Surgeon: Surgeon(s):  Arnie Nielsen DO    Procedure: Procedure(s):  COLONOSCOPY    Medications prior to admission:   Prior to Admission medications    Medication Sig Start Date End Date Taking? Authorizing Provider   pravastatin (PRAVACHOL) 80 MG tablet Take 1 tablet by mouth nightly   Yes Ortiz Lemus MD   insulin aspart (NOVOLOG) 100 UNIT/ML injection vial 3 times daily (before meals) Sliding scale depending on the blood sugars    Ortiz Lemus MD   nitroGLYCERIN (NITROSTAT) 0.4 MG SL tablet Place 1 tablet under the tongue every 5 minutes as needed    Ortiz Lemus MD   Dulaglutide 0.75 MG/0.5ML SOPN Inject into the skin once a week    Ortiz Lemus MD   busPIRone (BUSPAR) 5 MG tablet Take 1 tablet by mouth daily    Ortiz Lemus MD   promethazine (PHENERGAN) 25 MG tablet TAKE ONE TABLET BY MOUTH TWICE A DAY AS NEEDED FOR NAUSEA 17   Ghanshyam Barros MD   pantoprazole (PROTONIX) 40 MG tablet Take 1 tablet by mouth daily 17   Ralph Rodriguez MD   amLODIPine (NORVASC) 10 MG tablet Take 1 tablet by mouth daily 16   Ghanshyam Barros MD   potassium chloride (MICRO-K) 10 MEQ CR capsule Take 1 capsule by mouth 2 times daily (with meals) 16   Ghanshyam Barros MD   hydrochlorothiazide (HYDRODIURIL) 25 MG tablet Take 1 tablet by mouth daily 16   Ghanshyam Barros MD   isosorbide mononitrate (IMDUR) 30 MG CR tablet Take 1 tablet by mouth daily  Patient taking differently: Take 1 tablet by mouth nightly 16   Ghanshyam Barros MD   candesartan (ATACAND) 32 MG tablet Take 1 tablet by mouth daily 16   Ghanshyam Barros MD   gabapentin (NEURONTIN) 800 MG tablet Take 1 tablet by mouth 3 times daily. 3/14/16   Ghanshyam Barros MD   HYDROcodone-acetaminophen (NORCO) 5-325 MG

## (undated) DEVICE — MEDICINE CUP, GRADUATED, STER: Brand: MEDLINE

## (undated) DEVICE — CANNULA NSL AD TBNG L7FT PVC STR NONFLARED PRNG O2 DEL W STD

## (undated) DEVICE — ELECTRODE,ECG,STRESS,FOAM,3PK: Brand: MEDLINE

## (undated) DEVICE — SOLUTION IRRIG 500ML 0.9% SOD CHL USP POUR PLAS BTL

## (undated) DEVICE — HYPODERMIC SAFETY NEEDLE: Brand: MAGELLAN

## (undated) DEVICE — INTENDED FOR TISSUE SEPARATION, AND OTHER PROCEDURES THAT REQUIRE A SHARP SURGICAL BLADE TO PUNCTURE OR CUT.: Brand: BARD-PARKER ® STAINLESS STEEL BLADES

## (undated) DEVICE — GAUZE,SPONGE,4"X4",8PLY,STRL,LF,10/TRAY: Brand: MEDLINE

## (undated) DEVICE — BANDAGE,GAUZE,BULKEE II,4.5"X4.1YD,STRL: Brand: MEDLINE

## (undated) DEVICE — SYRINGE, LUER LOCK, 10ML: Brand: MEDLINE

## (undated) DEVICE — BANDAGE COMPR W4INXL12FT E DISP ESMARCH EVEN

## (undated) DEVICE — STOCKINETTE CAST W6XL48IN WHT POLY IMPERV PRECUT SYN DBL

## (undated) DEVICE — STERILE LATEX POWDER-FREE SURGICAL GLOVESWITH NITRILE COATING: Brand: PROTEXIS

## (undated) DEVICE — CURITY NON-ADHERENT STRIPS: Brand: CURITY

## (undated) DEVICE — BNDG,ELSTC,MATRIX,STRL,4"X5YD,LF,HOOK&LP: Brand: MEDLINE

## (undated) DEVICE — ELECTRODE PT RET AD L9FT HI MOIST COND ADH HYDRGEL CORDED

## (undated) DEVICE — T-DRAPE,EXTREMITY,STERILE: Brand: MEDLINE

## (undated) DEVICE — WET SKIN PREP TRAY: Brand: MEDLINE INDUSTRIES, INC.

## (undated) DEVICE — C-ARM: Brand: UNBRANDED

## (undated) DEVICE — MASTISOL ADHESIVE LIQ 2/3ML

## (undated) DEVICE — FORCEPS BX 240CM 2.4MM L NDL RAD JAW 4 M00513334

## (undated) DEVICE — SUTURE COAT VCRL SZ 4-0 L18IN ABSRB UD L19MM PS-2 1/2 CIR J496G

## (undated) DEVICE — SUTURE VCRL SZ 4-0 L27IN ABSRB UD L26MM SH 1/2 CIR J415H

## (undated) DEVICE — SUTURE ETHLN SZ 5-0 L18IN NONABSORBABLE BLK L19MM PS-2 3/8 1666G

## (undated) DEVICE — SHEET,DRAPE,53X77,STERILE: Brand: MEDLINE

## (undated) DEVICE — DRESSING PETRO W3XL3IN OIL EMUL N ADH GZ KNIT IMPREG CELOS

## (undated) DEVICE — PACK PROCEDURE SURG EXTREMITY MFFOP CUST

## (undated) DEVICE — BANDAGE COMPR W4INXL5YD BGE HI E W/ REM CLP SURE-WRAP

## (undated) DEVICE — C-WIRE PAK DOUBLE ENDED ORTHOPAEDIC WIRE, SPADE, .062" (1.57 MM)
Type: IMPLANTABLE DEVICE | Site: FOOT | Status: NON-FUNCTIONAL
Brand: C-WIRE
Removed: 2022-12-20

## (undated) DEVICE — SUTURE VCRL + SZ 4-0 L18IN ABSRB UD L19MM PS-2 3/8 CIR PRIM VCP496H

## (undated) DEVICE — TOWEL,OR,DSP,ST,BLUE,STD,4/PK,20PK/CS: Brand: MEDLINE

## (undated) DEVICE — TOWEL: OR BLU 80/CS: Brand: MEDICAL ACTION INDUSTRIES

## (undated) DEVICE — SOLUTION IRRIG 500ML 0.9% SOD CHLO USP POUR PLAS BTL

## (undated) DEVICE — ENDOSCOPIC KIT 2 12 FT OP4 DE2 GWN SYR

## (undated) DEVICE — C-WIRE PAK DOUBLE ENDED ORTHOPAEDIC WIRE, SPADE, .062" (1.57 MM)
Type: IMPLANTABLE DEVICE | Site: FOOT | Status: NON-FUNCTIONAL
Brand: C-WIRE
Removed: 2023-10-23